# Patient Record
Sex: MALE | Race: BLACK OR AFRICAN AMERICAN | NOT HISPANIC OR LATINO | URBAN - METROPOLITAN AREA
[De-identification: names, ages, dates, MRNs, and addresses within clinical notes are randomized per-mention and may not be internally consistent; named-entity substitution may affect disease eponyms.]

---

## 2019-07-15 ENCOUNTER — OUTPATIENT (OUTPATIENT)
Dept: OUTPATIENT SERVICES | Facility: HOSPITAL | Age: 34
LOS: 1 days | End: 2019-07-15
Payer: COMMERCIAL

## 2019-07-15 DIAGNOSIS — Z22.321 CARRIER OR SUSPECTED CARRIER OF METHICILLIN SUSCEPTIBLE STAPHYLOCOCCUS AUREUS: ICD-10-CM

## 2019-07-15 LAB
MRSA PCR RESULT.: NEGATIVE — SIGNIFICANT CHANGE UP
S AUREUS DNA NOSE QL NAA+PROBE: NEGATIVE — SIGNIFICANT CHANGE UP

## 2019-07-15 PROCEDURE — 87641 MR-STAPH DNA AMP PROBE: CPT

## 2019-07-23 RX ORDER — POVIDONE-IODINE 5 %
1 AEROSOL (ML) TOPICAL ONCE
Refills: 0 | Status: DISCONTINUED | OUTPATIENT
Start: 2019-07-24 | End: 2019-07-24

## 2019-07-23 NOTE — H&P ADULT - NSHPLABSRESULTS_GEN_ALL_CORE
Preop cbc, bmp, pt/inr, ptt, ua wnl;   UA DOS  preop CXR WNL per clearance  preop EKG WNLper clearance

## 2019-07-23 NOTE — H&P ADULT - NSHPPHYSICALEXAM_GEN_ALL_CORE
33 y/o male, well nourished, well developed, NAD  MSK: Decreased ROM at the lumbar spine secondary to pain  sensation intact  DP 2+ brisk cap refill, EHL/FHL/TA/GS 5/5  Rest of PE per MD clearance 33 y/o male, well nourished, well developed, NAD  MSK: Decreased ROM at the lumbar spine secondary to pain  sensation intact  DP/TP 2+ brisk cap refill, EHL/FHL/TA/GS 5/5  Rest of PE per MD clearance
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
## 2019-07-24 ENCOUNTER — OUTPATIENT (OUTPATIENT)
Dept: OUTPATIENT SERVICES | Facility: HOSPITAL | Age: 34
LOS: 1 days | Discharge: HOME | End: 2019-07-24
Payer: COMMERCIAL

## 2019-07-24 VITALS
RESPIRATION RATE: 13 BRPM | DIASTOLIC BLOOD PRESSURE: 78 MMHG | SYSTOLIC BLOOD PRESSURE: 140 MMHG | HEART RATE: 86 BPM | OXYGEN SATURATION: 97 %

## 2019-07-24 VITALS
SYSTOLIC BLOOD PRESSURE: 132 MMHG | TEMPERATURE: 97 F | HEART RATE: 57 BPM | WEIGHT: 299.61 LBS | HEIGHT: 72 IN | RESPIRATION RATE: 16 BRPM | DIASTOLIC BLOOD PRESSURE: 75 MMHG

## 2019-07-24 DIAGNOSIS — L30.9 DERMATITIS, UNSPECIFIED: ICD-10-CM

## 2019-07-24 DIAGNOSIS — J30.9 ALLERGIC RHINITIS, UNSPECIFIED: ICD-10-CM

## 2019-07-24 DIAGNOSIS — Z98.890 OTHER SPECIFIED POSTPROCEDURAL STATES: Chronic | ICD-10-CM

## 2019-07-24 DIAGNOSIS — J45.909 UNSPECIFIED ASTHMA, UNCOMPLICATED: ICD-10-CM

## 2019-07-24 DIAGNOSIS — M51.36 OTHER INTERVERTEBRAL DISC DEGENERATION, LUMBAR REGION: ICD-10-CM

## 2019-07-24 LAB
APPEARANCE UR: CLEAR — SIGNIFICANT CHANGE UP
BILIRUB UR-MCNC: NEGATIVE — SIGNIFICANT CHANGE UP
COLOR SPEC: YELLOW — SIGNIFICANT CHANGE UP
DIFF PNL FLD: NEGATIVE — SIGNIFICANT CHANGE UP
GLUCOSE UR QL: NEGATIVE — SIGNIFICANT CHANGE UP
KETONES UR-MCNC: NEGATIVE — SIGNIFICANT CHANGE UP
LEUKOCYTE ESTERASE UR-ACNC: NEGATIVE — SIGNIFICANT CHANGE UP
NITRITE UR-MCNC: NEGATIVE — SIGNIFICANT CHANGE UP
PH UR: 6 — SIGNIFICANT CHANGE UP (ref 5–8)
PROT UR-MCNC: NEGATIVE MG/DL — SIGNIFICANT CHANGE UP
SP GR SPEC: 1.02 — SIGNIFICANT CHANGE UP (ref 1–1.03)
UROBILINOGEN FLD QL: 0.2 E.U./DL — SIGNIFICANT CHANGE UP

## 2019-07-24 PROCEDURE — 86850 RBC ANTIBODY SCREEN: CPT

## 2019-07-24 PROCEDURE — 86900 BLOOD TYPING SEROLOGIC ABO: CPT

## 2019-07-24 PROCEDURE — 76000 FLUOROSCOPY <1 HR PHYS/QHP: CPT

## 2019-07-24 PROCEDURE — 86901 BLOOD TYPING SEROLOGIC RH(D): CPT

## 2019-07-24 PROCEDURE — 81003 URINALYSIS AUTO W/O SCOPE: CPT

## 2019-07-24 PROCEDURE — 64635 DESTROY LUMB/SAC FACET JNT: CPT | Mod: 50

## 2019-07-24 RX ORDER — ACETAMINOPHEN 500 MG
650 TABLET ORAL EVERY 6 HOURS
Refills: 0 | Status: DISCONTINUED | OUTPATIENT
Start: 2019-07-24 | End: 2019-07-24

## 2019-07-24 RX ORDER — HYDROMORPHONE HYDROCHLORIDE 2 MG/ML
0.5 INJECTION INTRAMUSCULAR; INTRAVENOUS; SUBCUTANEOUS
Refills: 0 | Status: DISCONTINUED | OUTPATIENT
Start: 2019-07-24 | End: 2019-07-24

## 2019-07-24 RX ORDER — ONDANSETRON 8 MG/1
4 TABLET, FILM COATED ORAL EVERY 6 HOURS
Refills: 0 | Status: DISCONTINUED | OUTPATIENT
Start: 2019-07-24 | End: 2019-07-24

## 2019-07-24 RX ORDER — OXYCODONE AND ACETAMINOPHEN 5; 325 MG/1; MG/1
1 TABLET ORAL EVERY 4 HOURS
Refills: 0 | Status: DISCONTINUED | OUTPATIENT
Start: 2019-07-24 | End: 2019-07-24

## 2019-07-24 RX ORDER — OXYCODONE AND ACETAMINOPHEN 5; 325 MG/1; MG/1
2 TABLET ORAL EVERY 4 HOURS
Refills: 0 | Status: DISCONTINUED | OUTPATIENT
Start: 2019-07-24 | End: 2019-07-24

## 2019-07-24 RX ORDER — SODIUM CHLORIDE 9 MG/ML
1000 INJECTION, SOLUTION INTRAVENOUS
Refills: 0 | Status: DISCONTINUED | OUTPATIENT
Start: 2019-07-24 | End: 2019-07-24

## 2019-07-24 RX ADMIN — SODIUM CHLORIDE 100 MILLILITER(S): 9 INJECTION, SOLUTION INTRAVENOUS at 11:33

## 2019-07-24 NOTE — BRIEF OPERATIVE NOTE - NSICDXBRIEFPREOP_GEN_ALL_CORE_FT
PRE-OP DIAGNOSIS:  Degenerative disc disease at L5-S1 level 24-Jul-2019 11:18:39 Degenerative disc disease at L5-S1 level Lexii Dior

## 2019-07-24 NOTE — BRIEF OPERATIVE NOTE - NSICDXBRIEFPOSTOP_GEN_ALL_CORE_FT
POST-OP DIAGNOSIS:  Degenerative disc disease at L5-S1 level 24-Jul-2019 11:18:46 Degenerative disc disease at L5-S1 level Lexii Dior

## 2020-04-27 ENCOUNTER — TRANSCRIPTION ENCOUNTER (OUTPATIENT)
Age: 35
End: 2020-04-27

## 2020-08-17 NOTE — PRE-OP CHECKLIST - AICD PRESENT
Spoke with patient. Reviewed breast biopsy procedure and reviewed instructions for breast biopsy. Patient expressed understanding and all questions were answered. Provided patient with my phone number to call for any further concerns or questions.   Patient scheduled breast biopsy at the [facility] on 8/27/2020.  
no

## 2021-01-12 PROBLEM — J30.9 ALLERGIC RHINITIS, UNSPECIFIED: Chronic | Status: ACTIVE | Noted: 2019-07-23

## 2021-01-12 PROBLEM — L30.9 DERMATITIS, UNSPECIFIED: Chronic | Status: ACTIVE | Noted: 2019-07-23

## 2021-01-12 PROBLEM — J45.909 UNSPECIFIED ASTHMA, UNCOMPLICATED: Chronic | Status: ACTIVE | Noted: 2019-07-23

## 2021-01-21 PROBLEM — Z00.00 ENCOUNTER FOR PREVENTIVE HEALTH EXAMINATION: Status: ACTIVE | Noted: 2021-01-21

## 2021-02-05 ENCOUNTER — APPOINTMENT (OUTPATIENT)
Dept: VASCULAR SURGERY | Facility: CLINIC | Age: 36
End: 2021-02-05
Payer: COMMERCIAL

## 2021-02-05 PROCEDURE — 99072 ADDL SUPL MATRL&STAF TM PHE: CPT

## 2021-02-05 PROCEDURE — 99244 OFF/OP CNSLTJ NEW/EST MOD 40: CPT

## 2021-02-05 NOTE — ADDENDUM
[FreeTextEntry1] : This note was written by Clarissa Garces on 02/05/2021 acting as scribe for Rodolfo Hoyt M.D.\par \par I, Rodolfo Harris have read and attest that all the information, medical decision making and discharge instructions within are true and accurate.
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
## 2021-02-05 NOTE — PHYSICAL EXAM
[Respiratory Effort] : normal respiratory effort [Normal Rate and Rhythm] : normal rate and rhythm [No Rash or Lesion] : No rash or lesion [Alert] : alert [Oriented to Person] : oriented to person [Oriented to Place] : oriented to place [Oriented to Time] : oriented to time [Calm] : calm [Ankle Swelling (On Exam)] : not present [Varicose Veins Of Lower Extremities] : not present [] : not present [Abdomen Masses] : No abdominal masses [Abdomen Tenderness] : ~T ~M No abdominal tenderness [Abdominal Bruit] : no abdominal bruit  [de-identified] : Well appearing  [de-identified] : NC/AT  [de-identified] : Supple  [de-identified] : FROM

## 2021-02-05 NOTE — ASSESSMENT
[FreeTextEntry1] : 35 y/o M with chronic back pain presents for surgical discussion of upcoming L4-L5 ALIF on 2/17 with Dr. Gagnon. \par Vascular surgery approach discussed at great length with patient. The risks benefits and alternatives were explained to him including but not limiting to arterial, venous injury, wound infection, retrograde ejaculation, nerve damage, DVT, PE. Numerous questions were asked and answered to his satisfaction. From a vascular surgery stand point pt is cleared. To call the office in case of having any further questions. \par \par \par

## 2021-02-19 DIAGNOSIS — Z01.818 ENCOUNTER FOR OTHER PREPROCEDURAL EXAMINATION: ICD-10-CM

## 2021-02-20 ENCOUNTER — APPOINTMENT (OUTPATIENT)
Dept: DISASTER EMERGENCY | Facility: CLINIC | Age: 36
End: 2021-02-20

## 2021-02-21 LAB — SARS-COV-2 N GENE NPH QL NAA+PROBE: NOT DETECTED

## 2021-02-22 ENCOUNTER — TRANSCRIPTION ENCOUNTER (OUTPATIENT)
Age: 36
End: 2021-02-22

## 2021-02-22 VITALS
DIASTOLIC BLOOD PRESSURE: 87 MMHG | RESPIRATION RATE: 16 BRPM | SYSTOLIC BLOOD PRESSURE: 129 MMHG | HEIGHT: 73 IN | HEART RATE: 69 BPM | WEIGHT: 287.92 LBS | OXYGEN SATURATION: 98 % | TEMPERATURE: 97 F

## 2021-02-22 NOTE — H&P ADULT - NSHPLABSRESULTS_GEN_ALL_CORE
Preop cbc, bmp, pt/inr, ptt, wnl reviewed by medical clearance.  COVID negative 2/20/2021  ua DOS  3M DOS   preop ekg wnl reviewed by medical clearance   ECHO wnl reviewed by medical clearance  cardiac stress test wnl reviewed by medical clearance

## 2021-02-22 NOTE — H&P ADULT - NSHPPHYSICALEXAM_GEN_ALL_CORE
General: Alert and oriented, NAD  MSK:  Decreased ROM of lumbar spine secondary to pain.      Remainder of PE as per medical clearance General: Alert and oriented, NAD  MSK: Lower extremities skin intact, no erythema/sts/ecchymosis. SLT INTACT, DP/PT 2+, EHL/TA/GS 5/5.   Decreased ROM of lumbar spine secondary to pain.  Remainder of PE as per medical clearance

## 2021-02-22 NOTE — H&P ADULT - PROBLEM SELECTOR PLAN 1
Admit to orthopedics.  Presents for elective ALIF & PSF L4-S1  Medically optimized and cleared for surgery by Dr. Simmons
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
## 2021-02-23 ENCOUNTER — RESULT REVIEW (OUTPATIENT)
Age: 36
End: 2021-02-23

## 2021-02-23 ENCOUNTER — INPATIENT (INPATIENT)
Facility: HOSPITAL | Age: 36
LOS: 0 days | Discharge: HOME | DRG: 455 | End: 2021-02-24
Attending: ORTHOPAEDIC SURGERY | Admitting: ORTHOPAEDIC SURGERY
Payer: COMMERCIAL

## 2021-02-23 DIAGNOSIS — J45.909 UNSPECIFIED ASTHMA, UNCOMPLICATED: ICD-10-CM

## 2021-02-23 DIAGNOSIS — J30.9 ALLERGIC RHINITIS, UNSPECIFIED: ICD-10-CM

## 2021-02-23 DIAGNOSIS — M54.9 DORSALGIA, UNSPECIFIED: ICD-10-CM

## 2021-02-23 DIAGNOSIS — Z98.890 OTHER SPECIFIED POSTPROCEDURAL STATES: Chronic | ICD-10-CM

## 2021-02-23 LAB
BASE EXCESS BLDA CALC-SCNC: -3.9 MMOL/L — LOW (ref -2–3)
BLD GP AB SCN SERPL QL: NEGATIVE — SIGNIFICANT CHANGE UP
CA-I BLDA-SCNC: 1.04 MMOL/L — LOW (ref 1.12–1.3)
COHGB MFR BLDA: 0.2 % — SIGNIFICANT CHANGE UP
GAS PNL BLDA: SIGNIFICANT CHANGE UP
GAS PNL BLDA: SIGNIFICANT CHANGE UP
GLUCOSE BLDC GLUCOMTR-MCNC: 128 MG/DL — HIGH (ref 70–99)
HCO3 BLDA-SCNC: 19 MMOL/L — LOW (ref 21–28)
HGB BLDA-MCNC: 13.1 G/DL — SIGNIFICANT CHANGE UP (ref 13–17)
METHGB MFR BLDA: 0.3 % — SIGNIFICANT CHANGE UP
O2 CT VFR BLDA CALC: 19.7 ML/DL — SIGNIFICANT CHANGE UP (ref 15–23)
OXYHGB MFR BLDA: 99 % — SIGNIFICANT CHANGE UP (ref 94–100)
PCO2 BLDA: 27 MMHG — LOW (ref 35–48)
PH BLDA: 7.45 — SIGNIFICANT CHANGE UP (ref 7.35–7.45)
PO2 BLDA: 506 MMHG — HIGH (ref 83–108)
POTASSIUM BLDA-SCNC: 3.4 MMOL/L — LOW (ref 3.5–4.9)
RH IG SCN BLD-IMP: POSITIVE — SIGNIFICANT CHANGE UP
SAO2 % BLDA: 100 % — SIGNIFICANT CHANGE UP (ref 95–100)
SODIUM BLDA-SCNC: 139 MMOL/L — SIGNIFICANT CHANGE UP (ref 138–146)

## 2021-02-23 PROCEDURE — 22585 ARTHRD ANT NTRBD MIN DSC EA: CPT | Mod: GC,62

## 2021-02-23 PROCEDURE — 88304 TISSUE EXAM BY PATHOLOGIST: CPT | Mod: 26

## 2021-02-23 PROCEDURE — 22558 ARTHRD ANT NTRBD MIN DSC LUM: CPT | Mod: GC,62

## 2021-02-23 RX ORDER — CHLORHEXIDINE GLUCONATE 213 G/1000ML
1 SOLUTION TOPICAL ONCE
Refills: 0 | Status: COMPLETED | OUTPATIENT
Start: 2021-02-23 | End: 2021-02-23

## 2021-02-23 RX ORDER — SENNA PLUS 8.6 MG/1
2 TABLET ORAL AT BEDTIME
Refills: 0 | Status: DISCONTINUED | OUTPATIENT
Start: 2021-02-23 | End: 2021-02-24

## 2021-02-23 RX ORDER — ROBINUL 0.2 MG/ML
0.2 INJECTION INTRAMUSCULAR; INTRAVENOUS ONCE
Refills: 0 | Status: DISCONTINUED | OUTPATIENT
Start: 2021-02-23 | End: 2021-02-24

## 2021-02-23 RX ORDER — METHOCARBAMOL 500 MG/1
500 TABLET, FILM COATED ORAL EVERY 8 HOURS
Refills: 0 | Status: DISCONTINUED | OUTPATIENT
Start: 2021-02-23 | End: 2021-02-24

## 2021-02-23 RX ORDER — MAGNESIUM HYDROXIDE 400 MG/1
30 TABLET, CHEWABLE ORAL EVERY 12 HOURS
Refills: 0 | Status: DISCONTINUED | OUTPATIENT
Start: 2021-02-23 | End: 2021-02-24

## 2021-02-23 RX ORDER — OXYCODONE HYDROCHLORIDE 5 MG/1
5 TABLET ORAL EVERY 4 HOURS
Refills: 0 | Status: DISCONTINUED | OUTPATIENT
Start: 2021-02-23 | End: 2021-02-23

## 2021-02-23 RX ORDER — GABAPENTIN 400 MG/1
300 CAPSULE ORAL ONCE
Refills: 0 | Status: COMPLETED | OUTPATIENT
Start: 2021-02-23 | End: 2021-02-23

## 2021-02-23 RX ORDER — ONDANSETRON 8 MG/1
4 TABLET, FILM COATED ORAL EVERY 6 HOURS
Refills: 0 | Status: DISCONTINUED | OUTPATIENT
Start: 2021-02-23 | End: 2021-02-24

## 2021-02-23 RX ORDER — ACETAMINOPHEN 500 MG
1000 TABLET ORAL ONCE
Refills: 0 | Status: COMPLETED | OUTPATIENT
Start: 2021-02-23 | End: 2021-02-23

## 2021-02-23 RX ORDER — HYDROMORPHONE HYDROCHLORIDE 2 MG/ML
30 INJECTION INTRAMUSCULAR; INTRAVENOUS; SUBCUTANEOUS
Refills: 0 | Status: DISCONTINUED | OUTPATIENT
Start: 2021-02-23 | End: 2021-02-24

## 2021-02-23 RX ORDER — CEFAZOLIN SODIUM 1 G
2000 VIAL (EA) INJECTION EVERY 8 HOURS
Refills: 0 | Status: COMPLETED | OUTPATIENT
Start: 2021-02-23 | End: 2021-02-24

## 2021-02-23 RX ORDER — SODIUM CHLORIDE 9 MG/ML
1000 INJECTION, SOLUTION INTRAVENOUS
Refills: 0 | Status: DISCONTINUED | OUTPATIENT
Start: 2021-02-23 | End: 2021-02-24

## 2021-02-23 RX ORDER — UMECLIDINIUM 62.5 UG/1
1 AEROSOL, POWDER ORAL
Qty: 0 | Refills: 0 | DISCHARGE

## 2021-02-23 RX ORDER — FLUTICASONE FUROATE AND VILANTEROL TRIFENATATE 100; 25 UG/1; UG/1
1 POWDER RESPIRATORY (INHALATION)
Qty: 0 | Refills: 0 | DISCHARGE

## 2021-02-23 RX ORDER — ONDANSETRON 8 MG/1
4 TABLET, FILM COATED ORAL EVERY 6 HOURS
Refills: 0 | Status: DISCONTINUED | OUTPATIENT
Start: 2021-02-23 | End: 2021-02-23

## 2021-02-23 RX ORDER — CETIRIZINE HYDROCHLORIDE 10 MG/1
1 TABLET ORAL
Qty: 0 | Refills: 0 | DISCHARGE

## 2021-02-23 RX ORDER — ACETAMINOPHEN 500 MG
1000 TABLET ORAL EVERY 8 HOURS
Refills: 0 | Status: DISCONTINUED | OUTPATIENT
Start: 2021-02-23 | End: 2021-02-24

## 2021-02-23 RX ORDER — HYDROMORPHONE HYDROCHLORIDE 2 MG/ML
0.5 INJECTION INTRAMUSCULAR; INTRAVENOUS; SUBCUTANEOUS
Refills: 0 | Status: DISCONTINUED | OUTPATIENT
Start: 2021-02-23 | End: 2021-02-24

## 2021-02-23 RX ORDER — BUPIVACAINE 13.3 MG/ML
20 INJECTION, SUSPENSION, LIPOSOMAL INFILTRATION ONCE
Refills: 0 | Status: DISCONTINUED | OUTPATIENT
Start: 2021-02-23 | End: 2021-02-24

## 2021-02-23 RX ORDER — APREPITANT 80 MG/1
40 CAPSULE ORAL ONCE
Refills: 0 | Status: COMPLETED | OUTPATIENT
Start: 2021-02-23 | End: 2021-02-23

## 2021-02-23 RX ORDER — PROCHLORPERAZINE MALEATE 5 MG
10 TABLET ORAL EVERY 8 HOURS
Refills: 0 | Status: DISCONTINUED | OUTPATIENT
Start: 2021-02-23 | End: 2021-02-24

## 2021-02-23 RX ORDER — NALOXONE HYDROCHLORIDE 4 MG/.1ML
0.1 SPRAY NASAL
Refills: 0 | Status: DISCONTINUED | OUTPATIENT
Start: 2021-02-23 | End: 2021-02-24

## 2021-02-23 RX ADMIN — SENNA PLUS 2 TABLET(S): 8.6 TABLET ORAL at 23:19

## 2021-02-23 RX ADMIN — METHOCARBAMOL 500 MILLIGRAM(S): 500 TABLET, FILM COATED ORAL at 23:19

## 2021-02-23 RX ADMIN — Medication 1000 MILLIGRAM(S): at 07:01

## 2021-02-23 RX ADMIN — Medication 1000 MILLIGRAM(S): at 23:18

## 2021-02-23 RX ADMIN — GABAPENTIN 300 MILLIGRAM(S): 400 CAPSULE ORAL at 07:01

## 2021-02-23 RX ADMIN — APREPITANT 40 MILLIGRAM(S): 80 CAPSULE ORAL at 07:01

## 2021-02-23 RX ADMIN — HYDROMORPHONE HYDROCHLORIDE 30 MILLILITER(S): 2 INJECTION INTRAMUSCULAR; INTRAVENOUS; SUBCUTANEOUS at 20:01

## 2021-02-23 RX ADMIN — Medication 100 MILLIGRAM(S): at 21:23

## 2021-02-23 RX ADMIN — CHLORHEXIDINE GLUCONATE 1 APPLICATION(S): 213 SOLUTION TOPICAL at 07:28

## 2021-02-23 NOTE — PACU DISCHARGE NOTE - COMMENTS
pt met pacu criteria to be tx to telemetry. Vitals stable, pain controlled, no bleeding noted.  Report given to CharleeWnara Coto

## 2021-02-23 NOTE — BRIEF OPERATIVE NOTE - NSICDXBRIEFPROCEDURE_GEN_ALL_CORE_FT
PROCEDURES:  Fusion, posterior spinal column, lumbar, percutaneous 23-Feb-2021 15:20:30  Carlos Naqvi  Open ALIF 23-Feb-2021 15:20:16  Carlos Naqvi

## 2021-02-24 ENCOUNTER — TRANSCRIPTION ENCOUNTER (OUTPATIENT)
Age: 36
End: 2021-02-24

## 2021-02-24 VITALS
HEART RATE: 75 BPM | TEMPERATURE: 98 F | SYSTOLIC BLOOD PRESSURE: 124 MMHG | RESPIRATION RATE: 17 BRPM | OXYGEN SATURATION: 96 % | DIASTOLIC BLOOD PRESSURE: 67 MMHG

## 2021-02-24 LAB
ANION GAP SERPL CALC-SCNC: 13 MMOL/L — SIGNIFICANT CHANGE UP (ref 5–17)
BASOPHILS # BLD AUTO: 0.01 K/UL — SIGNIFICANT CHANGE UP (ref 0–0.2)
BASOPHILS NFR BLD AUTO: 0.1 % — SIGNIFICANT CHANGE UP (ref 0–2)
BUN SERPL-MCNC: 11 MG/DL — SIGNIFICANT CHANGE UP (ref 7–23)
CALCIUM SERPL-MCNC: 8.8 MG/DL — SIGNIFICANT CHANGE UP (ref 8.4–10.5)
CHLORIDE SERPL-SCNC: 106 MMOL/L — SIGNIFICANT CHANGE UP (ref 96–108)
CO2 SERPL-SCNC: 23 MMOL/L — SIGNIFICANT CHANGE UP (ref 22–31)
CREAT SERPL-MCNC: 1.05 MG/DL — SIGNIFICANT CHANGE UP (ref 0.5–1.3)
EOSINOPHIL # BLD AUTO: 0 K/UL — SIGNIFICANT CHANGE UP (ref 0–0.5)
EOSINOPHIL NFR BLD AUTO: 0 % — SIGNIFICANT CHANGE UP (ref 0–6)
GLUCOSE SERPL-MCNC: 102 MG/DL — HIGH (ref 70–99)
HCT VFR BLD CALC: 41.4 % — SIGNIFICANT CHANGE UP (ref 39–50)
HGB BLD-MCNC: 13.4 G/DL — SIGNIFICANT CHANGE UP (ref 13–17)
IMM GRANULOCYTES NFR BLD AUTO: 0.5 % — SIGNIFICANT CHANGE UP (ref 0–1.5)
LYMPHOCYTES # BLD AUTO: 1.17 K/UL — SIGNIFICANT CHANGE UP (ref 1–3.3)
LYMPHOCYTES # BLD AUTO: 10.6 % — LOW (ref 13–44)
MCHC RBC-ENTMCNC: 28.2 PG — SIGNIFICANT CHANGE UP (ref 27–34)
MCHC RBC-ENTMCNC: 32.4 GM/DL — SIGNIFICANT CHANGE UP (ref 32–36)
MCV RBC AUTO: 87 FL — SIGNIFICANT CHANGE UP (ref 80–100)
MONOCYTES # BLD AUTO: 0.77 K/UL — SIGNIFICANT CHANGE UP (ref 0–0.9)
MONOCYTES NFR BLD AUTO: 7 % — SIGNIFICANT CHANGE UP (ref 2–14)
NEUTROPHILS # BLD AUTO: 9.04 K/UL — HIGH (ref 1.8–7.4)
NEUTROPHILS NFR BLD AUTO: 81.8 % — HIGH (ref 43–77)
NRBC # BLD: 0 /100 WBCS — SIGNIFICANT CHANGE UP (ref 0–0)
PLATELET # BLD AUTO: 297 K/UL — SIGNIFICANT CHANGE UP (ref 150–400)
POTASSIUM SERPL-MCNC: 3.9 MMOL/L — SIGNIFICANT CHANGE UP (ref 3.5–5.3)
POTASSIUM SERPL-SCNC: 3.9 MMOL/L — SIGNIFICANT CHANGE UP (ref 3.5–5.3)
RBC # BLD: 4.76 M/UL — SIGNIFICANT CHANGE UP (ref 4.2–5.8)
RBC # FLD: 12.9 % — SIGNIFICANT CHANGE UP (ref 10.3–14.5)
SODIUM SERPL-SCNC: 142 MMOL/L — SIGNIFICANT CHANGE UP (ref 135–145)
WBC # BLD: 11.04 K/UL — HIGH (ref 3.8–10.5)
WBC # FLD AUTO: 11.04 K/UL — HIGH (ref 3.8–10.5)

## 2021-02-24 PROCEDURE — 82962 GLUCOSE BLOOD TEST: CPT

## 2021-02-24 PROCEDURE — 82330 ASSAY OF CALCIUM: CPT

## 2021-02-24 PROCEDURE — 86901 BLOOD TYPING SEROLOGIC RH(D): CPT

## 2021-02-24 PROCEDURE — 86900 BLOOD TYPING SEROLOGIC ABO: CPT

## 2021-02-24 PROCEDURE — C1769: CPT

## 2021-02-24 PROCEDURE — 99254 IP/OBS CNSLTJ NEW/EST MOD 60: CPT

## 2021-02-24 PROCEDURE — 80048 BASIC METABOLIC PNL TOTAL CA: CPT

## 2021-02-24 PROCEDURE — 84295 ASSAY OF SERUM SODIUM: CPT

## 2021-02-24 PROCEDURE — 88304 TISSUE EXAM BY PATHOLOGIST: CPT

## 2021-02-24 PROCEDURE — 86850 RBC ANTIBODY SCREEN: CPT

## 2021-02-24 PROCEDURE — 94640 AIRWAY INHALATION TREATMENT: CPT

## 2021-02-24 PROCEDURE — 76000 FLUOROSCOPY <1 HR PHYS/QHP: CPT

## 2021-02-24 PROCEDURE — 36415 COLL VENOUS BLD VENIPUNCTURE: CPT

## 2021-02-24 PROCEDURE — 85018 HEMOGLOBIN: CPT

## 2021-02-24 PROCEDURE — C1713: CPT

## 2021-02-24 PROCEDURE — 85025 COMPLETE CBC W/AUTO DIFF WBC: CPT

## 2021-02-24 PROCEDURE — 97161 PT EVAL LOW COMPLEX 20 MIN: CPT

## 2021-02-24 PROCEDURE — C1889: CPT

## 2021-02-24 PROCEDURE — 84132 ASSAY OF SERUM POTASSIUM: CPT

## 2021-02-24 PROCEDURE — 82803 BLOOD GASES ANY COMBINATION: CPT

## 2021-02-24 RX ORDER — SENNA PLUS 8.6 MG/1
2 TABLET ORAL
Qty: 0 | Refills: 0 | DISCHARGE
Start: 2021-02-24

## 2021-02-24 RX ORDER — OXYCODONE HYDROCHLORIDE 5 MG/1
5 TABLET ORAL EVERY 4 HOURS
Refills: 0 | Status: DISCONTINUED | OUTPATIENT
Start: 2021-02-24 | End: 2021-02-24

## 2021-02-24 RX ORDER — OXYCODONE HYDROCHLORIDE 5 MG/1
10 TABLET ORAL EVERY 4 HOURS
Refills: 0 | Status: DISCONTINUED | OUTPATIENT
Start: 2021-02-24 | End: 2021-02-24

## 2021-02-24 RX ORDER — LORATADINE 10 MG/1
10 TABLET ORAL DAILY
Refills: 0 | Status: DISCONTINUED | OUTPATIENT
Start: 2021-02-24 | End: 2021-02-24

## 2021-02-24 RX ORDER — TIOTROPIUM BROMIDE 18 UG/1
1 CAPSULE ORAL; RESPIRATORY (INHALATION) DAILY
Refills: 0 | Status: DISCONTINUED | OUTPATIENT
Start: 2021-02-24 | End: 2021-02-24

## 2021-02-24 RX ORDER — BUDESONIDE AND FORMOTEROL FUMARATE DIHYDRATE 160; 4.5 UG/1; UG/1
2 AEROSOL RESPIRATORY (INHALATION)
Refills: 0 | Status: DISCONTINUED | OUTPATIENT
Start: 2021-02-24 | End: 2021-02-24

## 2021-02-24 RX ORDER — METHOCARBAMOL 500 MG/1
1 TABLET, FILM COATED ORAL
Qty: 42 | Refills: 0
Start: 2021-02-24 | End: 2021-03-09

## 2021-02-24 RX ORDER — POLYETHYLENE GLYCOL 3350 17 G/17G
17 POWDER, FOR SOLUTION ORAL DAILY
Refills: 0 | Status: DISCONTINUED | OUTPATIENT
Start: 2021-02-24 | End: 2021-02-24

## 2021-02-24 RX ORDER — OXYCODONE HYDROCHLORIDE 5 MG/1
1 TABLET ORAL
Qty: 42 | Refills: 0
Start: 2021-02-24 | End: 2021-03-02

## 2021-02-24 RX ADMIN — Medication 100 MILLIGRAM(S): at 06:57

## 2021-02-24 RX ADMIN — METHOCARBAMOL 500 MILLIGRAM(S): 500 TABLET, FILM COATED ORAL at 13:58

## 2021-02-24 RX ADMIN — OXYCODONE HYDROCHLORIDE 10 MILLIGRAM(S): 5 TABLET ORAL at 18:28

## 2021-02-24 RX ADMIN — TIOTROPIUM BROMIDE 1 CAPSULE(S): 18 CAPSULE ORAL; RESPIRATORY (INHALATION) at 11:25

## 2021-02-24 RX ADMIN — LORATADINE 10 MILLIGRAM(S): 10 TABLET ORAL at 11:24

## 2021-02-24 RX ADMIN — Medication 1000 MILLIGRAM(S): at 06:57

## 2021-02-24 RX ADMIN — POLYETHYLENE GLYCOL 3350 17 GRAM(S): 17 POWDER, FOR SOLUTION ORAL at 11:24

## 2021-02-24 RX ADMIN — BUDESONIDE AND FORMOTEROL FUMARATE DIHYDRATE 2 PUFF(S): 160; 4.5 AEROSOL RESPIRATORY (INHALATION) at 18:29

## 2021-02-24 RX ADMIN — Medication 1000 MILLIGRAM(S): at 13:56

## 2021-02-24 RX ADMIN — METHOCARBAMOL 500 MILLIGRAM(S): 500 TABLET, FILM COATED ORAL at 06:57

## 2021-02-24 NOTE — DISCHARGE NOTE PROVIDER - CARE PROVIDER_API CALL
Adriel Dominguez)  Orthopaedic Surgery  [address] [place] [id number]  Phone: [phone]  Fax: [phone]  Follow Up Time: 2 weeks

## 2021-02-24 NOTE — DISCHARGE NOTE PROVIDER - NSDCFUADDINST_GEN_ALL_CORE_FT
No strenuous activity (bending/twisting), heavy lifting, driving, exercising/gym activities or returning to work until cleared by MD.   Change dressing daily with gauze/tape till post-op day #5, then leave incision open to air.  You may shower post-op day#5, keep incision clean and dry.   Try to have regular bowel movements, take stool softener or laxative if necessary.  May take pepcid for upset stomach.  May apply ice to affected areas to decrease swelling.  Call to schedule an appt with Dr. Dominguez for follow up, if you have staples or sutures they will be removed in office.  Contact your doctor if you experience: fever greater than 101.5, chills, chest pain, difficulty breathing, redness or excessive drainage around the incision, other concerns.  Follow up with your primary care provider.

## 2021-02-24 NOTE — DISCHARGE NOTE PROVIDER - NSDCMRMEDTOKEN_GEN_ALL_CORE_FT
Breo Ellipta 200 mcg-25 mcg/inh inhalation powder: 1 puff(s) inhaled once a day  Incruse Ellipta 62.5 mcg/inh inhalation powder: 1 puff(s) inhaled every 24 hours  ZyrTEC 10 mg oral tablet: 1 tab(s) orally once a day   Breo Ellipta 200 mcg-25 mcg/inh inhalation powder: 1 puff(s) inhaled once a day  Incruse Ellipta 62.5 mcg/inh inhalation powder: 1 puff(s) inhaled every 24 hours  methocarbamol 500 mg oral tablet: 1 tab(s) orally every 8 hours MDD:3  oxyCODONE 5 mg oral tablet: 1-2  tab(s) orally every 4 hours, As needed, Moderate to severePain (4 - 6) MDD:6  senna oral tablet: 2 tab(s) orally once a day (at bedtime)  ZyrTEC 10 mg oral tablet: 1 tab(s) orally once a day

## 2021-02-24 NOTE — PROGRESS NOTE ADULT - SUBJECTIVE AND OBJECTIVE BOX
Ortho Notwe    Procedure: ALIF/PSF L4-S1  Surgeon: Alberto    pt seen at bedside this AM. on PCA. states pain is well controlled at rest however with movement pain increases significantly.   has had some leaks from his his catheter ON, will DC this AM.   has passed gas overnight no BMs.   tolerating PO clear diet without issues.   Denies CP, SOB, N/V, numbness/tingling     Vital Signs Last 24 Hrs  T(C): --  T(F): --  HR: 46 (02-23-21 @ 20:03) (46 - 66)  BP: 127/82 (02-23-21 @ 20:03) (120/72 - 136/79)  BP(mean): 99 (02-23-21 @ 20:03) (82 - 102)  RR: 17 (02-23-21 @ 20:03) (16 - 19)  SpO2: 100% (02-23-21 @ 20:03) (99% - 100%)      General: Pt Alert and oriented, NAD  Abd prineo intact, abd soft, nontender nondistended  back prineo intact.   Sensation intact BLL1-S1 b/l  Motor strength intact BL LE EHL/FHL/TA/GS 5/5  palpable DP pulses b/l           A/P: 36yMale POD#1 s/p above procedure 2/23   - Stable  - Pain Control  - DVT ppx: SCDs  - monitor abdominal exam  - DC bearden  - PT, WBS: WBAT  - Dispo home     Ortho Pager [phone]
Ortho Note    Pt comfortable without complaints, pain controlled  Denies CP, SOB, N/V, numbness/tingling     Vital Signs Last 24 Hrs  T(C): 36.7 (02-24-21 @ 08:22), Max: 36.7 (02-24-21 @ 08:22)  T(F): 98 (02-24-21 @ 08:22), Max: 98 (02-24-21 @ 08:22)  HR: 67 (02-24-21 @ 08:22) (67 - 76)  BP: 110/60 (02-24-21 @ 08:22) (110/60 - 115/56)  BP(mean): --  RR: 12 (02-24-21 @ 08:22) (12 - 20)  SpO2: 97% (02-24-21 @ 08:22) (97% - 99%)  AVSS    General: Pt Alert and oriented, NAD  DSG: abdominal prineo C/D/I, abdominal binder  Pulses: bilateral pedal 2+< wwp toes, cap refill <3sec toes  Sensation: bilateral SILT  Motor: bilateral 5/5 EHL/FHL/TA/GS    02-24    142  |  106  |  11  ----------------------------<  102<H>  3.9   |  23  |  1.05    Ca    8.8      24 Feb 2021 08:21                            13.4   11.04 )-----------( 297      ( 24 Feb 2021 08:21 )             41.4       A/P: 36y Male POD#1 s/p L4-S1 ALIF/PSF  - Stable, afebrile, nontoxic appearance  - Pain Control: po meds  - DVT ppx: SCDS  - PT, WBS: WBAT spinal precautions  -Bowel regimen: continue bowel regimen  -Dispo: home pending PT clearance    Ortho Pager [phone]
24 hr events: NAEO    Subjective: Pt seen and examined at bedside this AM. He reports he has some back pain, but otherwise is feeling well. Endorses passing gas, no BMs. Tolerating CLD. Has not worked with PT yet.      Vital Signs Last 24 Hrs  T(C): 36.7 (24 Feb 2021 08:22), Max: 36.7 (23 Feb 2021 22:05)  T(F): 98 (24 Feb 2021 08:22), Max: 98 (23 Feb 2021 22:05)  HR: 67 (24 Feb 2021 08:22) (46 - 76)  BP: 110/60 (24 Feb 2021 08:22) (110/60 - 137/99)  BP(mean): 114 (23 Feb 2021 21:40) (82 - 114)  RR: 12 (24 Feb 2021 08:22) (12 - 23)  SpO2: 97% (24 Feb 2021 08:22) (97% - 100%)    I&O's Summary    23 Feb 2021 07:01  -  24 Feb 2021 07:00  --------------------------------------------------------  IN: 5100 mL / OUT: 4100 mL / NET: 1000 mL    24 Feb 2021 07:01  -  24 Feb 2021 11:17  --------------------------------------------------------  IN: 500 mL / OUT: 150 mL / NET: 350 mL        Physical Exam:  General: NAD, resting comfortably  Pulmonary: normal resp effort  Cardiovascular: NSR  Abdominal: soft, minimally tender to palpation near incision, c/d/i  Extremities: WWP, normal strength, no clubbing/cyanosis/edema. Palpable pedal pulses  Neuro: A/O x 3, no focal deficits, sensation normal      Lines/drains/tubes:    LABS:                        13.4   11.04 )-----------( 297      ( 24 Feb 2021 08:21 )             41.4     02-24    142  |  106  |  11  ----------------------------<  102<H>  3.9   |  23  |  1.05    Ca    8.8      24 Feb 2021 08:21            CAPILLARY BLOOD GLUCOSE      POCT Blood Glucose.: 128 mg/dL (23 Feb 2021 14:30)      RADIOLOGY & ADDITIONAL TESTS:  
Ortho Post Op Check    Procedure: ALIF/PSF L4-S1  Surgeon: Alberto    Pt comfortable without complaints, pain controlled  Denies CP, SOB, N/V, numbness/tingling     Vital Signs Last 24 Hrs  T(C): --  T(F): --  HR: 46 (02-23-21 @ 20:03) (46 - 66)  BP: 127/82 (02-23-21 @ 20:03) (120/72 - 136/79)  BP(mean): 99 (02-23-21 @ 20:03) (82 - 102)  RR: 17 (02-23-21 @ 20:03) (16 - 19)  SpO2: 100% (02-23-21 @ 20:03) (99% - 100%)  AVSS    General: Pt Alert and oriented, NAD  Abd and back DSG C/D/I  Sensation intact BL LE  Motor strength intact BL LE      Post-op X-Ray: Intraop fluoro shows hardware intact     A/P: 36yMale POD#0 s/p above procedure 2/23   - Stable  - Pain Control  - DVT ppx: SCDs  - Post op abx: ancef periop  - PT, WBS: WBAT  - Dispo home     Ortho Pager [phone]
  Patient is a 36y old  Male who presents with a chief complaint of LBP (24 Feb 2021 11:49)      INTERVAL HPI/OVERNIGHT EVENTS:    Review of Systems: 12 point review of systems otherwise negative    MEDICATIONS  (STANDING):  acetaminophen   Tablet .. 1000 milliGRAM(s) Oral every 8 hours  budesonide 160 MICROgram(s)/formoterol 4.5 MICROgram(s) Inhaler 2 Puff(s) Inhalation two times a day  BUpivacaine liposome 1.3% Injectable (no eMAR) 20 milliLiter(s) Local Injection once  loratadine 10 milliGRAM(s) Oral daily  methocarbamol 500 milliGRAM(s) Oral every 8 hours  polyethylene glycol 3350 17 Gram(s) Oral daily  senna 2 Tablet(s) Oral at bedtime  tiotropium 18 MICROgram(s) Capsule 1 Capsule(s) Inhalation daily    MEDICATIONS  (PRN):  aluminum hydroxide/magnesium hydroxide/simethicone Suspension 30 milliLiter(s) Oral every 12 hours PRN Indigestion  glycopyrrolate Injectable 0.2 milliGRAM(s) IV Push once PRN HR <60  HYDROmorphone  Injectable 0.5 milliGRAM(s) IV Push every 15 minutes PRN Severe Pain (7 - 10)  magnesium hydroxide Suspension 30 milliLiter(s) Oral every 12 hours PRN Constipation  oxyCODONE    IR 5 milliGRAM(s) Oral every 4 hours PRN Moderate Pain (4 - 6)  oxyCODONE    IR 10 milliGRAM(s) Oral every 4 hours PRN Severe Pain (7 - 10)  prochlorperazine   Injectable 10 milliGRAM(s) IV Push every 8 hours PRN 1st line zofran ineffective      Allergies    penicillins (Anaphylaxis; Rash)  Seafood (Anaphylaxis)  shellfish (Anaphylaxis)    Intolerances          Vital Signs Last 24 Hrs  T(C): 36.7 (24 Feb 2021 08:22), Max: 36.7 (23 Feb 2021 22:05)  T(F): 98 (24 Feb 2021 08:22), Max: 98 (23 Feb 2021 22:05)  HR: 67 (24 Feb 2021 08:22) (46 - 76)  BP: 110/60 (24 Feb 2021 08:22) (110/60 - 137/99)  BP(mean): 114 (23 Feb 2021 21:40) (82 - 114)  RR: 12 (24 Feb 2021 08:22) (12 - 23)  SpO2: 97% (24 Feb 2021 08:22) (97% - 100%)  CAPILLARY BLOOD GLUCOSE      POCT Blood Glucose.: 128 mg/dL (23 Feb 2021 14:30)      02-23 @ 07:01 - 02-24 @ 07:00  --------------------------------------------------------  IN: 5100 mL / OUT: 4100 mL / NET: 1000 mL    02-24 @ 07:01  - 02-24 @ 13:46  --------------------------------------------------------  IN: 500 mL / OUT: 150 mL / NET: 350 mL        Physical Exam:    Daily     Daily   General:  Well appearing, NAD, not cachetic  HEENT:  Nonicteric, PERRLA  CV:  RRR, no murmur, no JVD  Lungs:  CTA B/L, no wheezes, rales, rhonchi  Abdomen:  Soft, non-tender, no distended, positive BS, no hepatosplenomegaly; +abd binder   Extremities:  2+ pulses, no c/c, no edema; dsg c/d/i  Skin:  Warm and dry, no rashes  :  No bearden  Neuro:  AAOx3, non-focal, CN II-XII grossly intact  No Restraints    LABS:                        13.4   11.04 )-----------( 297      ( 24 Feb 2021 08:21 )             41.4     02-24    142  |  106  |  11  ----------------------------<  102<H>  3.9   |  23  |  1.05    Ca    8.8      24 Feb 2021 08:21

## 2021-02-24 NOTE — PROGRESS NOTE ADULT - ASSESSMENT
36 y.o m pmh asthma, allergies, lumbar back pain now POD#1 anterior-posterior fusion L4-S1.    #pod1  pain control  bowel regimen  pt  IS    #leukocytosis  reactive likely 2/2 surgery  monitor fever curve    #asthma  #allergies  please resume home medications    dvt ppx per primary team  diet regular

## 2021-02-24 NOTE — DISCHARGE NOTE PROVIDER - NSDCCPTREATMENT_GEN_ALL_CORE_FT
PRINCIPAL PROCEDURE  Procedure: Open ALIF  Findings and Treatment: L4-S1      SECONDARY PROCEDURE  Procedure: Fusion, posterior spinal column, lumbar, percutaneous  Findings and Treatment: L4-S1

## 2021-02-24 NOTE — DISCHARGE NOTE NURSING/CASE MANAGEMENT/SOCIAL WORK - PATIENT PORTAL LINK FT
You can access the FollowMyHealth Patient Portal offered by [facility] by registering at the following website: [email or web address]. By joining [facility]’s FollowMyHealth portal, you will also be able to view your health information using other applications (apps) compatible with our system.

## 2021-02-24 NOTE — PROGRESS NOTE ADULT - ASSESSMENT
A/P: 36yMale POD#1 anterior-posterior fusion L4-S1    Recommendations:  Continue to monitor for ROBF  Continue neurovascular checks  Rest of care per primary team  Vascular surgery will continue to follow

## 2021-02-24 NOTE — DISCHARGE NOTE PROVIDER - HOSPITAL COURSE
Admitted 2/23  Surgery 2/23 Anterior/Posterior Fusion L4-S1  Priscila-op Antibiotics  Pain control  DVT prophylaxis  OOB/Physical Therapy

## 2021-02-24 NOTE — PHYSICAL THERAPY INITIAL EVALUATION ADULT - ADDITIONAL COMMENTS
Patient lives alone in walk up apartment with 2 flights to enter. Does not use any Assistive Devices at baseline.

## 2021-02-24 NOTE — PHYSICAL THERAPY INITIAL EVALUATION ADULT - GAIT DEVIATIONS NOTED, PT EVAL
increased time in double stance/decreased velocity of limb motion/decreased step length/decreased swing-to-stance ratio
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
## 2021-02-24 NOTE — PHYSICAL THERAPY INITIAL EVALUATION ADULT - GENERAL OBSERVATIONS, REHAB EVAL
Patient received semi-griffin in bed  in NAD on RA, +SCDs, +Heplock, +Telemetry. Cleared by PRICILA Yan. Agreeable to PT.

## 2021-02-24 NOTE — DISCHARGE NOTE PROVIDER - NSDCCPCAREPLAN_GEN_ALL_CORE_FT
PRINCIPAL DISCHARGE DIAGNOSIS  Diagnosis: Back pain  Assessment and Plan of Treatment: improvement s/p Anterior/Posterior Fusion L4-S1

## 2021-03-02 LAB — SURGICAL PATHOLOGY STUDY: SIGNIFICANT CHANGE UP

## 2021-03-08 DIAGNOSIS — M51.26 OTHER INTERVERTEBRAL DISC DISPLACEMENT, LUMBAR REGION: ICD-10-CM

## 2021-03-08 DIAGNOSIS — M48.061 SPINAL STENOSIS, LUMBAR REGION WITHOUT NEUROGENIC CLAUDICATION: ICD-10-CM

## 2021-03-08 DIAGNOSIS — D72.829 ELEVATED WHITE BLOOD CELL COUNT, UNSPECIFIED: ICD-10-CM

## 2021-03-08 DIAGNOSIS — Z88.0 ALLERGY STATUS TO PENICILLIN: ICD-10-CM

## 2021-03-08 DIAGNOSIS — E66.01 MORBID (SEVERE) OBESITY DUE TO EXCESS CALORIES: ICD-10-CM

## 2021-03-08 DIAGNOSIS — J45.909 UNSPECIFIED ASTHMA, UNCOMPLICATED: ICD-10-CM

## 2021-03-08 DIAGNOSIS — Z91.013 ALLERGY TO SEAFOOD: ICD-10-CM

## 2021-10-07 NOTE — PRE-OP CHECKLIST - WEIGHT IN KG
Internal Medicine Resident Progress Note     Patient: Rosangela Fragoso Date: 10/7/2021   YOB: 1949 Admission Date: 10/5/2021   MRN: [id number] Attending: Eliana Mo MD     Chief Complaint   Patient presents with   • Dizziness     Patient Active Problem List   Diagnosis   • Varicose veins of lower extremities with other complications   • Essential hypertension   • Generalized anxiety disorder   • Hyperlipidemia, 10-year ASCVD 14.6%   • Monoarticular arthritis   • Morbid obesity (CMS/HCC)   • Pain of left lower leg   • Abdominal pain, acute, epigastric   • GERD (gastroesophageal reflux disease)   • Dyspepsia   • Chronic kidney disease, stage III (moderate) (CMS/HCC)     Rosangela Fragoso is a 71 year old female with PMH of vertigo, HTN, HLD, NICOLE,CAD (last catheterization in 2014 with mild distal LAD disease), CKD stage 3, morbid obesity, and varicose leg veins admitted on 10/5/2021 with lightheadness. Neurostroke code was called in the emergency department, and patient underwent CT head and CTA head and neck without acute findings. Evaluated with teleneurology, and symptoms not felt to be acute stroke. Patient was admitted to the teaching service for observation in stable condition.     Troponin came back elevated at 0.14 with minor EKG changes (possible T-wave flattening) but no symptoms. Cardiology was consulted for further discussion of her EKG, concern for NSTEMI based on elevated cardiac marker. Patient was not started on heparin as her troponin trended downward (0.13 repeated on 10/5/21). Echocardiogram was preformed which showed normal LVEF (68%)  without wall motion abnormality or valvular disease. Per cardiology recommendation, patient scheduled for Lexiscan nuclear stress test over today and tomorrow with plan for disposition pending results.    Neurology was consulted due to concern for TIA as cause of patient's symptoms on presentation. Patient was evaluated by Dr. Shaik Johns with eventual  recommendation for outpatient follow up after MRI head deferred by patient.     Patient was evaluated by both physical therapy and occupational therapy staff with clearance for independent activity as usual upon disposition.     INTERVAL SUBJECTIVE  The patient had no acute overnight events. She denies any recurrence of dizziness or lightheadedness since original presentation to emergency department. She has not required any medications for dizziness while under observation.    Patient reports feeling overall well without dizziness. She notes experiencing transient episode of left medial leg discomfort described as \"jittery\" and \"aching\" sensations similar to past intermittent of the same since scleropathy surgery in 2019. She denies any current pain in leg and states she was able to sleep well last night. She notes normal eating (other than while on NPO protocol), urination, and bowel movements while admitted. Patient expresses mild anxiety regarding possibility of recurrence of dizziness episodes once returned home. She lives with and cares for her mother (92 years old). Patient denies any past use of meclizine at home. She denies headache, chest pain, shortness of breath, abdominal pain, nausea, or vomiting.    OBJECTIVE  Scheduled Medications potassium CHLORIDE, 40 mEq, Once  sodium chloride (PF), 2 mL, 2 times per day  hydrochlorothiazide, 25 mg, Daily  losartan, 50 mg, Daily  enoxaparin, 40 mg, 2 times per day  Potassium Standard Replacement Protocol, , See Admin Instructions  Magnesium Standard Replacement Protocol, , See Admin Instructions  aspirin, 81 mg, Daily  nystatin, , TID  atorvastatin, 40 mg, Daily      PRN Medications sodium chloride, 25 mL, PRN  meclizine, 25 mg, TID PRN  sodium chloride, 500 mL, PRN  sodium chloride, 25 mL, PRN  ondansetron, 4 mg, BID PRN  polyethylene glycol, 17 g, Daily PRN  aluminum-magnesium hydroxide-simethicone, 30 mL, Q4H PRN  acetaminophen, 650 mg, Q4H PRN    Or  acetaminophen, 650 mg, Q4H PRN      Continuous Infusions     Vital Last Value 24 Hour Range   Temperature 97 °F (36.1 °C) (10/07/21 0825) Temp  Min: 96.5 °F (35.8 °C)  Max: 97.9 °F (36.6 °C)   Pulse 68 (10/07/21 0825) Pulse  Min: 68  Max: 79   Respiratory 18 (10/07/21 0825) Resp  Min: 16  Max: 18   Non-Invasive  Blood Pressure (!) 153/70 (10/07/21 0825) BP  Min: 113/53  Max: 162/74   Arterial   Blood Pressure   No data recorded   Pulse Oximetry 96 % (10/07/21 0825) SpO2  Min: 95 %  Max: 97 %     Height/Weight Today Admitted   Weight 103.6 kg (228 lb 4.8 oz) (10/05/21 1041) Weight: 103.6 kg (228 lb 4.8 oz) (10/05/21 1041)   Height N/A Height: 5' 3\" (160 cm) (10/05/21 1041)   BMI N/A BMI (Calculated): 40.44 (10/05/21 1041)     Intake/Output  Last Stool Occurrence:      Intake/Output Summary (Last 24 hours) at 10/7/2021 1259  Last data filed at 10/6/2021 1336  Gross per 24 hour   Intake 120 ml   Output --   Net 120 ml       Active Lines and Nursing Skin Assessments  Peripheral IV 10/05/21 Left Antecubital 18 (Active)   Site Assessment WDL 10/06/21 0900   Dressing Type Transparent 10/06/21 0900   Lumen Cap Applied/Changed On 10/05/21 10/05/21 1800   Line Status Line flushed 10/06/21 0900   Interventions Capped IV 10/06/21 0900       Wound Abdomen Skin fold Moisture Associated Skin Damage (MASD) (Active)   Wound Exudate Mild odor 10/06/21 0750   Cleansing Agent Soap and water 10/06/21 0750   Wound Bed/Tissue Type Intact;Red;Pink 10/06/21 0750   Periwound Condition Moist 10/06/21 0750   Topical Agent Antifungal powder 10/06/21 0750       Skin Abnormality Groin Right (Active)       PHYSICAL EXAM  General: Alert, cooperative, conversive. No acute distress.  Skin: Warm, dry. Mild erythema present between skin folds of abdominal area.  HEENT:EOMI. PERRL. The sclerae and conjunctivae are normal bilaterally.  The soft palate is symmetrical without lesion.  The posterior pharynx is without lesion, edema or erythema.  Neck:   The trachea is midline.  No cervical or supraclavicular lymphadenopathy.  Respiratory: Bilaterally clear to auscultation.  Non-labored respirations.  Normal respiratory effort.  Cardiovascular: Regular rate and rhythm and S1, S2 present.  Abdomen: Soft, non-tender, non-distended. Positive bowel sounds all quadrants. No guarding or rebound. No hepatomegaly or splenomegaly.   Extremities and Spine:  No edema.  No deformity.  No tenderness..   Back:  Normal alignment. No CVA  or midline bony tenderness.    Neuro:  Alert, oriented x4.  Speech intact.  No dysphasia or dysarthria.  Symmetrical facial structures.  Strength 5/5 all extremities.  Sensation intact to light touch.  Psych:  Affect is appropriate with normal social interaction.    LABORATORY RESULTS (24 hour)  Recent Results (from the past 24 hour(s))   Basic Metabolic Panel    Collection Time: 10/07/21  4:44 AM   Result Value Ref Range    Fasting Status      Sodium 139 135 - 145 mmol/L    Potassium 4.0 3.4 - 5.1 mmol/L    Chloride 104 98 - 107 mmol/L    Carbon Dioxide 30 21 - 32 mmol/L    Anion Gap 9 (L) 10 - 20 mmol/L    Glucose 95 70 - 99 mg/dL    BUN 20 6 - 20 mg/dL    Creatinine 1.00 (H) 0.51 - 0.95 mg/dL    Glomerular Filtration Rate 57 (L) >=60    BUN/ Creatinine Ratio 20 7 - 25    Calcium 8.2 (L) 8.4 - 10.2 mg/dL   CBC No Differential    Collection Time: 10/07/21  4:44 AM   Result Value Ref Range    WBC 6.4 4.2 - 11.0 K/mcL    RBC 3.93 (L) 4.00 - 5.20 mil/mcL    HGB 11.8 (L) 12.0 - 15.5 g/dL    HCT 36.0 36.0 - 46.5 %    MCV 91.6 78.0 - 100.0 fl    MCH 30.0 26.0 - 34.0 pg    MCHC 32.8 32.0 - 36.5 g/dL     140 - 450 K/mcL    RDW-CV 13.2 11.0 - 15.0 %    RDW-SD 44.9 39.0 - 50.0 fL    NRBC 0 <=0 /100 WBC   Stress test    Collection Time: 10/07/21 11:03 AM   Result Value Ref Range    REPORT TEXT monitored by Dr. Alva      EKG (Most recent)  Results for orders placed or performed during the hospital encounter of 10/05/21   Electrocardiogram 12-Lead    Result Value Ref Range    Ventricular Rate EKG/Min (BPM) 58     Atrial Rate (BPM) 58     ME-Interval (MSEC) 180     QRS-Interval (MSEC) 84     QT-Interval (MSEC) 414     QTc 407     P Axis (Degrees) 51     R Axis (Degrees) -10     T Axis (Degrees) 25     REPORT TEXT       Sinus bradycardia  Otherwise normal ECG  When compared with ECG of  22-APR-2020 03:29,  Vent. rate  has decreased  BY  36 BPM  Confirmed by ZEUS LAGOS MD ([id number]),  Blanca Nicole ([id number]) on 10/6/2021 7:06:37 AM       IMAGING  CT Angio Head and Neck Level 1   Final Result   IMPRESSION:       1. Unremarkable arterial vasculature of the head and neck.      2. Mild age-appropriate central and cortical atrophy and mild probable   chronic ischemic microvascular changes.      3. Degenerative changes of the cervical spine, as described above.      CT Head Level 1   Final Result   IMPRESSION: Negative.      Results were called to Aurelio Marlow PA-C,at 1008 hours on10/5/2021         ECHO (1228 on 10/6/2021):  Upper normal left ventricular wall thickness. LVEF = 68 %. No regional wall motion abnormalities. E/e' septal = 12.  Normal right ventricular size and systolic function.  No significant valve abnormalities.  No pericardial effusion.  Definity contrast was utilized to better visualize the endocardial definition.    MICROBIOLOGY    Quality Indicators   AMI With Heart Failure with Reduced LVEF (<40%)? no  Heart failure?  No  Stroke measures indicated? No  AMI? No  DVT/VTE Prophylaxis:  VTE Pharmacologic Prophylaxis: Yes  VTE Mechanical Prophylaxis: Yes  Severe sepsis with septic shock?  No    ASSESSMENT AND PLAN  Rosangela Fragoso is a 71 year old female with PMH of vertigo, HTN, HLD, NICOLE,CAD (last catheterization in 2014), CKD stage 3, and morbid obesity admitted on 10/5/2021 with lightheadness.     Further evaluation and work up as follows:     Lightheadedness; improved  Dizziness; improved  Patient with history of dizziness, more severe this time.  130.6 Workup for central nervous lesion unremarkable, and workup for cardiac cause unremarkable thus far.  Most consistent with vestibular cause.  Concern for TIA  - Neuro stroke called in the emergency department, negative CT, CTA  - Neurology consulted, appreciate recommendations  - After discussion with Dr. Johns, MRI brain deferred, as symptoms have resolved  - Dr. Johns discussed primary stroke prevention and risk factor modification with patient   - Daily aspirin 81 mg  - Continue q4h neurochecks  Concern for cardiac origin  - Troponin elevated to 0.14, trended down to 0.13  - EKG with T-wave flattening, discussed EKG with cardiology fellow  - Cardiology consulted, appreciate recommendations  - Transthoracic echo without reduced ejection fraction or wall motion abnormality  - Lexiscan nuclear stress started today to be completed tomorrow, as ordered by cardiology  - Meclizine available     Hypertension; controlled  - Continue PTA losartan 50 mg, hydrochlorothiazide 25 mg  - Elevated blood pressure of 153/70 mmHg this morning measured concurrently with administration of anti-hypertensive medications. Will recheck blood pressure tomorrow to determine necessity for further intervention.    CKD stage 3; baseline creatinine: 0.9-1.1, eGFR 50-66  - Creatinine 1.0, eGFR 57 on repeat BMP (10/7/21)  - Avoid nephrotoxins      Impaired glucose tolerance  - Hemoglobin A1c 5.7 (10/5/21)  - Glucose improved to 95 on repeat BMP (10/7/21)    Hyperlipidemia; well controlled  - Transitioned to high-dose atorvastatin (40mg daily) from low-dose atorvastatin given concern for stroke  - Lipid panel with excellent profile    Skin fold erythema  - Suspect moisture-associated damage, as assessed and described by nursing staff  - Continue daily topical nystatin powder administration while admitted    BEST PRACTICES     Fluids: none   Isolation: none  DM: no DM, daily check  Electrolyte PRN's: yes  Nutrition: PO Cardiac  Pain: currently  none  N/V: currently none  PT/OT: consulted  Disposition: Observation    Signed,  Wilmer Gtz, MS3  10/7/2021 12:59 PM    The patient's history and physical were discussed with Dr. Eliana Mo MD, who upon seeing the patient agreed with the above assessment and plan.

## 2021-12-29 NOTE — PHYSICAL THERAPY INITIAL EVALUATION ADULT - ADL SKILLS, REHAB EVAL
Care Management    CM spoke with patient via phone for patient assessment. Patient admitted for hypoglycemia and was positive for Covid. Patient lives with his wife and is independent of all ADLS. No needs identified for patient during patient assessment. CM to follow.   
independent

## 2022-06-17 VITALS — WEIGHT: 303 LBS | SYSTOLIC BLOOD PRESSURE: 120 MMHG | DIASTOLIC BLOOD PRESSURE: 80 MMHG

## 2023-02-12 ENCOUNTER — NON-APPOINTMENT (OUTPATIENT)
Age: 38
End: 2023-02-12

## 2023-03-30 ENCOUNTER — NON-APPOINTMENT (OUTPATIENT)
Age: 38
End: 2023-03-30

## 2023-03-30 DIAGNOSIS — Z91.013 ALLERGY TO SEAFOOD: ICD-10-CM

## 2023-03-30 DIAGNOSIS — J06.9 ACUTE UPPER RESPIRATORY INFECTION, UNSPECIFIED: ICD-10-CM

## 2023-03-30 DIAGNOSIS — M54.50 LOW BACK PAIN, UNSPECIFIED: ICD-10-CM

## 2023-03-30 DIAGNOSIS — Z82.69 FAMILY HISTORY OF OTHER DISEASES OF THE MUSCULOSKELETAL SYSTEM AND CONNECTIVE TISSUE: ICD-10-CM

## 2023-03-30 DIAGNOSIS — Z78.9 OTHER SPECIFIED HEALTH STATUS: ICD-10-CM

## 2023-03-30 DIAGNOSIS — M51.37 OTHER INTERVERTEBRAL DISC DEGENERATION, LUMBOSACRAL REGION: ICD-10-CM

## 2023-03-30 DIAGNOSIS — Z87.2 PERSONAL HISTORY OF DISEASES OF THE SKIN AND SUBCUTANEOUS TISSUE: ICD-10-CM

## 2023-03-30 DIAGNOSIS — R07.9 CHEST PAIN, UNSPECIFIED: ICD-10-CM

## 2023-03-30 DIAGNOSIS — Z82.49 FAMILY HISTORY OF ISCHEMIC HEART DISEASE AND OTHER DISEASES OF THE CIRCULATORY SYSTEM: ICD-10-CM

## 2023-03-30 DIAGNOSIS — G89.29 LOW BACK PAIN, UNSPECIFIED: ICD-10-CM

## 2023-05-05 PROBLEM — M54.9 DORSALGIA, UNSPECIFIED: Chronic | Status: ACTIVE | Noted: 2021-02-22

## 2023-05-26 ENCOUNTER — APPOINTMENT (OUTPATIENT)
Dept: INTERNAL MEDICINE | Facility: CLINIC | Age: 38
End: 2023-05-26
Payer: COMMERCIAL

## 2023-05-26 VITALS
OXYGEN SATURATION: 98 % | TEMPERATURE: 97.6 F | WEIGHT: 306.8 LBS | BODY MASS INDEX: 40.66 KG/M2 | HEIGHT: 73 IN | DIASTOLIC BLOOD PRESSURE: 90 MMHG | HEART RATE: 81 BPM | RESPIRATION RATE: 18 BRPM | SYSTOLIC BLOOD PRESSURE: 145 MMHG

## 2023-05-26 DIAGNOSIS — E66.01 MORBID (SEVERE) OBESITY DUE TO EXCESS CALORIES: ICD-10-CM

## 2023-05-26 DIAGNOSIS — T78.2XXD ANAPHYLACTIC SHOCK, UNSPECIFIED, SUBSEQUENT ENCOUNTER: ICD-10-CM

## 2023-05-26 PROCEDURE — G0447 BEHAVIOR COUNSEL OBESITY 15M: CPT | Mod: 59

## 2023-05-26 PROCEDURE — 99214 OFFICE O/P EST MOD 30 MIN: CPT | Mod: 25

## 2023-05-26 PROCEDURE — 94010 BREATHING CAPACITY TEST: CPT

## 2023-05-26 RX ORDER — EPINEPHRINE 0.3 MG/.3ML
0.3 INJECTION INTRAMUSCULAR
Qty: 2 | Refills: 1 | Status: ACTIVE | COMMUNITY
Start: 1900-01-01 | End: 1900-01-01

## 2023-05-26 RX ORDER — MONTELUKAST SODIUM 10 MG/1
10 TABLET, FILM COATED ORAL
Refills: 0 | Status: DISCONTINUED | COMMUNITY
End: 2023-05-26

## 2023-05-31 LAB
ALBUMIN SERPL ELPH-MCNC: 4.3 G/DL
ALP BLD-CCNC: 67 U/L
ALT SERPL-CCNC: 23 U/L
ANION GAP SERPL CALC-SCNC: 14 MMOL/L
AST SERPL-CCNC: 18 U/L
BILIRUB SERPL-MCNC: 0.4 MG/DL
BUN SERPL-MCNC: 12 MG/DL
CALCIUM SERPL-MCNC: 9.4 MG/DL
CHLORIDE SERPL-SCNC: 103 MMOL/L
CHOLEST SERPL-MCNC: 134 MG/DL
CO2 SERPL-SCNC: 26 MMOL/L
CREAT SERPL-MCNC: 1.33 MG/DL
EGFR: 70 ML/MIN/1.73M2
ESTIMATED AVERAGE GLUCOSE: 114 MG/DL
GLUCOSE SERPL-MCNC: 102 MG/DL
HBA1C MFR BLD HPLC: 5.6 %
HDLC SERPL-MCNC: 46 MG/DL
LDLC SERPL CALC-MCNC: 67 MG/DL
NONHDLC SERPL-MCNC: 88 MG/DL
POTASSIUM SERPL-SCNC: 4.5 MMOL/L
PROT SERPL-MCNC: 6.9 G/DL
SODIUM SERPL-SCNC: 143 MMOL/L
TRIGL SERPL-MCNC: 106 MG/DL

## 2023-05-31 NOTE — REVIEW OF SYSTEMS
[Fever] : no fever [Chills] : no chills [Pain] : no pain [Earache] : no earache [Sore Throat] : no sore throat [Chest Pain] : no chest pain [Palpitations] : no palpitations [Shortness Of Breath] : no shortness of breath [Wheezing] : no wheezing [Cough] : no cough [Nausea] : no nausea [Constipation] : no constipation [Vomiting] : no vomiting [Dysuria] : no dysuria [Frequency] : no frequency [Joint Pain] : no joint pain [Itching] : no itching [Skin Rash] : no skin rash [Headache] : no headache [Dizziness] : no dizziness

## 2023-05-31 NOTE — PHYSICAL EXAM
[No Acute Distress] : no acute distress [EOMI] : extraocular movements intact [Normal TMs] : both tympanic membranes were normal [No Lymphadenopathy] : no lymphadenopathy [No Respiratory Distress] : no respiratory distress  [Normal Rate] : normal rate  [Regular Rhythm] : with a regular rhythm [No Carotid Bruits] : no carotid bruits [Soft] : abdomen soft [Non Tender] : non-tender [No Rash] : no rash [No Focal Deficits] : no focal deficits [de-identified] : +1 edema bilat

## 2023-05-31 NOTE — ASSESSMENT
[FreeTextEntry1] : Labs\par PFT -\par Discussed Low na sat fat refined CHO diet\par Advised resuming Immunotherapy\par All questions answered\par Re check 3 months

## 2023-05-31 NOTE — HISTORY OF PRESENT ILLNESS
[FreeTextEntry1] : Pt here for yearly check up \par Notes nocturia x 2 months, nl color no odor\par No fever chills cough or wheeze\par No additional complaints\par denies any sob/wheezing/n/v/d/ha/ chest pain/ chest palpations or dizziness.  [de-identified] : Rhinitis\par Asthma\par Nocturia

## 2023-06-22 ENCOUNTER — NON-APPOINTMENT (OUTPATIENT)
Age: 38
End: 2023-06-22

## 2023-07-03 ENCOUNTER — TRANSCRIPTION ENCOUNTER (OUTPATIENT)
Age: 38
End: 2023-07-03

## 2023-10-07 ENCOUNTER — NON-APPOINTMENT (OUTPATIENT)
Age: 38
End: 2023-10-07

## 2023-10-11 ENCOUNTER — APPOINTMENT (OUTPATIENT)
Dept: INTERNAL MEDICINE | Facility: CLINIC | Age: 38
End: 2023-10-11
Payer: COMMERCIAL

## 2023-10-11 VITALS
DIASTOLIC BLOOD PRESSURE: 83 MMHG | BODY MASS INDEX: 40.82 KG/M2 | WEIGHT: 308 LBS | HEIGHT: 73 IN | RESPIRATION RATE: 18 BRPM | SYSTOLIC BLOOD PRESSURE: 123 MMHG | OXYGEN SATURATION: 98 % | HEART RATE: 88 BPM | TEMPERATURE: 97.3 F

## 2023-10-11 PROCEDURE — 99214 OFFICE O/P EST MOD 30 MIN: CPT | Mod: 25

## 2023-10-11 PROCEDURE — 94010 BREATHING CAPACITY TEST: CPT

## 2023-10-11 RX ORDER — AZITHROMYCIN 250 MG/1
250 TABLET, FILM COATED ORAL
Qty: 6 | Refills: 0 | Status: ACTIVE | COMMUNITY
Start: 2023-10-11 | End: 1900-01-01

## 2023-10-20 ENCOUNTER — APPOINTMENT (OUTPATIENT)
Dept: INTERNAL MEDICINE | Facility: CLINIC | Age: 38
End: 2023-10-20
Payer: COMMERCIAL

## 2023-10-20 VITALS
SYSTOLIC BLOOD PRESSURE: 142 MMHG | OXYGEN SATURATION: 97 % | HEART RATE: 76 BPM | TEMPERATURE: 97.9 F | WEIGHT: 312 LBS | RESPIRATION RATE: 18 BRPM | BODY MASS INDEX: 41.35 KG/M2 | HEIGHT: 73 IN | DIASTOLIC BLOOD PRESSURE: 84 MMHG

## 2023-10-20 VITALS — SYSTOLIC BLOOD PRESSURE: 130 MMHG | DIASTOLIC BLOOD PRESSURE: 88 MMHG

## 2023-10-20 PROCEDURE — 99213 OFFICE O/P EST LOW 20 MIN: CPT

## 2023-10-20 RX ORDER — LEVOFLOXACIN 750 MG/1
750 TABLET, FILM COATED ORAL DAILY
Qty: 7 | Refills: 0 | Status: ACTIVE | COMMUNITY
Start: 2023-10-20 | End: 1900-01-01

## 2023-10-20 RX ORDER — METHYLPREDNISOLONE 4 MG/1
4 TABLET ORAL
Qty: 1 | Refills: 0 | Status: ACTIVE | COMMUNITY
Start: 2023-10-20 | End: 1900-01-01

## 2023-11-01 ENCOUNTER — APPOINTMENT (OUTPATIENT)
Dept: INTERNAL MEDICINE | Facility: CLINIC | Age: 38
End: 2023-11-01
Payer: COMMERCIAL

## 2023-11-01 VITALS
RESPIRATION RATE: 18 BRPM | HEART RATE: 77 BPM | WEIGHT: 315 LBS | TEMPERATURE: 97.8 F | BODY MASS INDEX: 41.75 KG/M2 | OXYGEN SATURATION: 95 % | DIASTOLIC BLOOD PRESSURE: 83 MMHG | SYSTOLIC BLOOD PRESSURE: 141 MMHG | HEIGHT: 73 IN

## 2023-11-01 VITALS — SYSTOLIC BLOOD PRESSURE: 112 MMHG | DIASTOLIC BLOOD PRESSURE: 72 MMHG

## 2023-11-01 DIAGNOSIS — J40 BRONCHITIS, NOT SPECIFIED AS ACUTE OR CHRONIC: ICD-10-CM

## 2023-11-01 DIAGNOSIS — Z23 ENCOUNTER FOR IMMUNIZATION: ICD-10-CM

## 2023-11-01 DIAGNOSIS — J45.909 UNSPECIFIED ASTHMA, UNCOMPLICATED: ICD-10-CM

## 2023-11-01 PROCEDURE — G0008: CPT | Mod: 59

## 2023-11-01 PROCEDURE — 99213 OFFICE O/P EST LOW 20 MIN: CPT | Mod: 25

## 2023-11-01 PROCEDURE — 90686 IIV4 VACC NO PRSV 0.5 ML IM: CPT

## 2023-11-01 RX ORDER — FLUTICASONE FUROATE AND VILANTEROL TRIFENATATE 200; 25 UG/1; UG/1
200-25 POWDER RESPIRATORY (INHALATION)
Qty: 3 | Refills: 1 | Status: ACTIVE | COMMUNITY
Start: 1900-01-01 | End: 1900-01-01

## 2023-11-01 RX ORDER — UMECLIDINIUM 62.5 UG/1
62.5 AEROSOL, POWDER ORAL
Qty: 90 | Refills: 1 | Status: ACTIVE | COMMUNITY
Start: 1900-01-01 | End: 1900-01-01

## 2024-04-24 ENCOUNTER — APPOINTMENT (OUTPATIENT)
Dept: INTERNAL MEDICINE | Facility: CLINIC | Age: 39
End: 2024-04-24
Payer: COMMERCIAL

## 2024-04-24 PROCEDURE — 95165 ANTIGEN THERAPY SERVICES: CPT

## 2024-04-24 PROCEDURE — 95117 IMMUNOTHERAPY INJECTIONS: CPT

## 2024-05-01 ENCOUNTER — APPOINTMENT (OUTPATIENT)
Dept: INTERNAL MEDICINE | Facility: CLINIC | Age: 39
End: 2024-05-01
Payer: COMMERCIAL

## 2024-05-01 PROCEDURE — 95165 ANTIGEN THERAPY SERVICES: CPT

## 2024-05-01 PROCEDURE — 95117 IMMUNOTHERAPY INJECTIONS: CPT

## 2024-05-10 ENCOUNTER — APPOINTMENT (OUTPATIENT)
Dept: INTERNAL MEDICINE | Facility: CLINIC | Age: 39
End: 2024-05-10
Payer: COMMERCIAL

## 2024-05-10 PROCEDURE — 95165 ANTIGEN THERAPY SERVICES: CPT

## 2024-05-10 PROCEDURE — 95117 IMMUNOTHERAPY INJECTIONS: CPT

## 2024-05-20 ENCOUNTER — APPOINTMENT (OUTPATIENT)
Dept: INTERNAL MEDICINE | Facility: CLINIC | Age: 39
End: 2024-05-20
Payer: COMMERCIAL

## 2024-05-20 PROCEDURE — 95117 IMMUNOTHERAPY INJECTIONS: CPT

## 2024-05-20 PROCEDURE — 95165 ANTIGEN THERAPY SERVICES: CPT

## 2024-05-29 ENCOUNTER — APPOINTMENT (OUTPATIENT)
Dept: INTERNAL MEDICINE | Facility: CLINIC | Age: 39
End: 2024-05-29
Payer: COMMERCIAL

## 2024-05-29 PROCEDURE — 95117 IMMUNOTHERAPY INJECTIONS: CPT

## 2024-05-29 PROCEDURE — 95165 ANTIGEN THERAPY SERVICES: CPT

## 2024-06-07 ENCOUNTER — APPOINTMENT (OUTPATIENT)
Dept: INTERNAL MEDICINE | Facility: CLINIC | Age: 39
End: 2024-06-07
Payer: COMMERCIAL

## 2024-06-07 PROCEDURE — 95117 IMMUNOTHERAPY INJECTIONS: CPT

## 2024-06-07 PROCEDURE — 95165 ANTIGEN THERAPY SERVICES: CPT

## 2024-06-14 ENCOUNTER — APPOINTMENT (OUTPATIENT)
Dept: INTERNAL MEDICINE | Facility: CLINIC | Age: 39
End: 2024-06-14
Payer: COMMERCIAL

## 2024-06-14 PROCEDURE — 95165 ANTIGEN THERAPY SERVICES: CPT

## 2024-06-14 PROCEDURE — 95117 IMMUNOTHERAPY INJECTIONS: CPT

## 2024-06-21 ENCOUNTER — APPOINTMENT (OUTPATIENT)
Dept: INTERNAL MEDICINE | Facility: CLINIC | Age: 39
End: 2024-06-21
Payer: COMMERCIAL

## 2024-06-21 PROCEDURE — 95117 IMMUNOTHERAPY INJECTIONS: CPT

## 2024-06-21 PROCEDURE — 95165 ANTIGEN THERAPY SERVICES: CPT

## 2024-06-28 ENCOUNTER — APPOINTMENT (OUTPATIENT)
Dept: INTERNAL MEDICINE | Facility: CLINIC | Age: 39
End: 2024-06-28
Payer: COMMERCIAL

## 2024-06-28 DIAGNOSIS — J30.9 ALLERGIC RHINITIS, UNSPECIFIED: ICD-10-CM

## 2024-06-28 PROCEDURE — 95165 ANTIGEN THERAPY SERVICES: CPT

## 2024-06-28 PROCEDURE — 95117 IMMUNOTHERAPY INJECTIONS: CPT

## 2024-07-12 ENCOUNTER — APPOINTMENT (OUTPATIENT)
Dept: INTERNAL MEDICINE | Facility: CLINIC | Age: 39
End: 2024-07-12
Payer: COMMERCIAL

## 2024-07-12 PROCEDURE — 95117 IMMUNOTHERAPY INJECTIONS: CPT

## 2024-07-12 PROCEDURE — 95165 ANTIGEN THERAPY SERVICES: CPT

## 2024-07-19 ENCOUNTER — APPOINTMENT (OUTPATIENT)
Dept: INTERNAL MEDICINE | Facility: CLINIC | Age: 39
End: 2024-07-19
Payer: COMMERCIAL

## 2024-07-19 DIAGNOSIS — J30.9 ALLERGIC RHINITIS, UNSPECIFIED: ICD-10-CM

## 2024-07-19 PROCEDURE — 95165 ANTIGEN THERAPY SERVICES: CPT

## 2024-07-19 PROCEDURE — 95117 IMMUNOTHERAPY INJECTIONS: CPT

## 2024-07-29 ENCOUNTER — APPOINTMENT (OUTPATIENT)
Dept: INTERNAL MEDICINE | Facility: CLINIC | Age: 39
End: 2024-07-29
Payer: COMMERCIAL

## 2024-07-29 DIAGNOSIS — J30.9 ALLERGIC RHINITIS, UNSPECIFIED: ICD-10-CM

## 2024-07-29 PROCEDURE — 95117 IMMUNOTHERAPY INJECTIONS: CPT

## 2024-07-29 PROCEDURE — 95165 ANTIGEN THERAPY SERVICES: CPT

## 2024-08-16 ENCOUNTER — APPOINTMENT (OUTPATIENT)
Dept: INTERNAL MEDICINE | Facility: CLINIC | Age: 39
End: 2024-08-16
Payer: COMMERCIAL

## 2024-08-16 DIAGNOSIS — J30.9 ALLERGIC RHINITIS, UNSPECIFIED: ICD-10-CM

## 2024-08-16 PROCEDURE — 95117 IMMUNOTHERAPY INJECTIONS: CPT

## 2024-08-16 PROCEDURE — 95165 ANTIGEN THERAPY SERVICES: CPT

## 2024-08-21 NOTE — PATIENT PROFILE ADULT - DO YOU NEED ADDITIONAL SERVICES TO MANAGE ANY OF THESE MEDICAL CONDITIONS AT HOME?
Problem: Pain  Goal: # Acceptable pain/comfort level is achieved/maintained at rest using age and developmentally appropriate pediatric pain scale (NRS, FACES, FLACC, NPASS)  Outcome: Monitoring/Evaluating progress  Goal: # Patient and/or caregiver verbalizes understanding of pain management  Description: Documented in Patient Education Activity  Outcome: Monitoring/Evaluating progress     
no

## 2024-08-30 ENCOUNTER — APPOINTMENT (OUTPATIENT)
Dept: INTERNAL MEDICINE | Facility: CLINIC | Age: 39
End: 2024-08-30
Payer: COMMERCIAL

## 2024-08-30 DIAGNOSIS — J30.9 ALLERGIC RHINITIS, UNSPECIFIED: ICD-10-CM

## 2024-08-30 PROCEDURE — 95165 ANTIGEN THERAPY SERVICES: CPT

## 2024-08-30 PROCEDURE — 95117 IMMUNOTHERAPY INJECTIONS: CPT

## 2024-09-13 ENCOUNTER — APPOINTMENT (OUTPATIENT)
Dept: INTERNAL MEDICINE | Facility: CLINIC | Age: 39
End: 2024-09-13
Payer: COMMERCIAL

## 2024-09-13 PROCEDURE — 95165 ANTIGEN THERAPY SERVICES: CPT

## 2024-09-13 PROCEDURE — 95117 IMMUNOTHERAPY INJECTIONS: CPT

## 2024-09-20 ENCOUNTER — APPOINTMENT (OUTPATIENT)
Dept: INTERNAL MEDICINE | Facility: CLINIC | Age: 39
End: 2024-09-20
Payer: COMMERCIAL

## 2024-09-20 DIAGNOSIS — J30.9 ALLERGIC RHINITIS, UNSPECIFIED: ICD-10-CM

## 2024-09-20 PROCEDURE — 95165 ANTIGEN THERAPY SERVICES: CPT

## 2024-09-20 PROCEDURE — 95117 IMMUNOTHERAPY INJECTIONS: CPT

## 2024-11-01 ENCOUNTER — APPOINTMENT (OUTPATIENT)
Dept: INTERNAL MEDICINE | Facility: CLINIC | Age: 39
End: 2024-11-01
Payer: COMMERCIAL

## 2024-11-01 PROCEDURE — 95165 ANTIGEN THERAPY SERVICES: CPT

## 2024-11-01 PROCEDURE — 95117 IMMUNOTHERAPY INJECTIONS: CPT

## 2024-11-08 ENCOUNTER — APPOINTMENT (OUTPATIENT)
Dept: INTERNAL MEDICINE | Facility: CLINIC | Age: 39
End: 2024-11-08

## 2024-11-08 ENCOUNTER — NON-APPOINTMENT (OUTPATIENT)
Age: 39
End: 2024-11-08

## 2024-11-08 PROCEDURE — G0008: CPT

## 2024-11-08 PROCEDURE — 95117 IMMUNOTHERAPY INJECTIONS: CPT

## 2024-11-08 PROCEDURE — 95165 ANTIGEN THERAPY SERVICES: CPT

## 2024-11-08 PROCEDURE — 90656 IIV3 VACC NO PRSV 0.5 ML IM: CPT

## 2024-11-15 ENCOUNTER — APPOINTMENT (OUTPATIENT)
Dept: INTERNAL MEDICINE | Facility: CLINIC | Age: 39
End: 2024-11-15
Payer: COMMERCIAL

## 2024-11-15 PROCEDURE — 95165 ANTIGEN THERAPY SERVICES: CPT

## 2024-11-15 PROCEDURE — 95117 IMMUNOTHERAPY INJECTIONS: CPT

## 2024-11-22 ENCOUNTER — APPOINTMENT (OUTPATIENT)
Dept: INTERNAL MEDICINE | Facility: CLINIC | Age: 39
End: 2024-11-22
Payer: COMMERCIAL

## 2024-11-22 DIAGNOSIS — J30.9 ALLERGIC RHINITIS, UNSPECIFIED: ICD-10-CM

## 2024-11-22 PROCEDURE — 95117 IMMUNOTHERAPY INJECTIONS: CPT

## 2024-11-22 PROCEDURE — 95165 ANTIGEN THERAPY SERVICES: CPT

## 2024-12-02 ENCOUNTER — APPOINTMENT (OUTPATIENT)
Dept: INTERNAL MEDICINE | Facility: CLINIC | Age: 39
End: 2024-12-02
Payer: COMMERCIAL

## 2024-12-02 PROCEDURE — 95165 ANTIGEN THERAPY SERVICES: CPT

## 2024-12-02 PROCEDURE — 95117 IMMUNOTHERAPY INJECTIONS: CPT

## 2024-12-13 ENCOUNTER — APPOINTMENT (OUTPATIENT)
Dept: INTERNAL MEDICINE | Facility: CLINIC | Age: 39
End: 2024-12-13
Payer: COMMERCIAL

## 2024-12-13 PROCEDURE — 95117 IMMUNOTHERAPY INJECTIONS: CPT

## 2024-12-13 PROCEDURE — 95165 ANTIGEN THERAPY SERVICES: CPT

## 2024-12-20 ENCOUNTER — APPOINTMENT (OUTPATIENT)
Dept: INTERNAL MEDICINE | Facility: CLINIC | Age: 39
End: 2024-12-20
Payer: COMMERCIAL

## 2024-12-20 DIAGNOSIS — J30.9 ALLERGIC RHINITIS, UNSPECIFIED: ICD-10-CM

## 2024-12-20 PROCEDURE — 95117 IMMUNOTHERAPY INJECTIONS: CPT

## 2024-12-20 PROCEDURE — 95165 ANTIGEN THERAPY SERVICES: CPT

## 2025-01-03 ENCOUNTER — APPOINTMENT (OUTPATIENT)
Dept: INTERNAL MEDICINE | Facility: CLINIC | Age: 40
End: 2025-01-03
Payer: COMMERCIAL

## 2025-01-03 DIAGNOSIS — J30.9 ALLERGIC RHINITIS, UNSPECIFIED: ICD-10-CM

## 2025-01-03 PROCEDURE — 95117 IMMUNOTHERAPY INJECTIONS: CPT

## 2025-01-03 PROCEDURE — 95165 ANTIGEN THERAPY SERVICES: CPT

## 2025-01-15 ENCOUNTER — APPOINTMENT (OUTPATIENT)
Dept: INTERNAL MEDICINE | Facility: CLINIC | Age: 40
End: 2025-01-15

## 2025-01-27 ENCOUNTER — APPOINTMENT (OUTPATIENT)
Dept: INTERNAL MEDICINE | Facility: CLINIC | Age: 40
End: 2025-01-27
Payer: COMMERCIAL

## 2025-01-27 VITALS — SYSTOLIC BLOOD PRESSURE: 126 MMHG | DIASTOLIC BLOOD PRESSURE: 84 MMHG

## 2025-01-27 VITALS
HEIGHT: 73 IN | SYSTOLIC BLOOD PRESSURE: 129 MMHG | RESPIRATION RATE: 18 BRPM | DIASTOLIC BLOOD PRESSURE: 63 MMHG | OXYGEN SATURATION: 98 % | BODY MASS INDEX: 40.56 KG/M2 | HEART RATE: 71 BPM | WEIGHT: 306 LBS | TEMPERATURE: 97.1 F

## 2025-01-27 DIAGNOSIS — E66.01 MORBID (SEVERE) OBESITY DUE TO EXCESS CALORIES: ICD-10-CM

## 2025-01-27 DIAGNOSIS — Z00.00 ENCOUNTER FOR GENERAL ADULT MEDICAL EXAMINATION W/OUT ABNORMAL FINDINGS: ICD-10-CM

## 2025-01-27 PROCEDURE — 94060 EVALUATION OF WHEEZING: CPT

## 2025-01-27 PROCEDURE — 99214 OFFICE O/P EST MOD 30 MIN: CPT | Mod: 25

## 2025-01-28 LAB
ALBUMIN SERPL ELPH-MCNC: 4.1 G/DL
ALP BLD-CCNC: 75 U/L
ALT SERPL-CCNC: 24 U/L
ANION GAP SERPL CALC-SCNC: 14 MMOL/L
APPEARANCE: CLEAR
AST SERPL-CCNC: 26 U/L
BACTERIA: NEGATIVE /HPF
BILIRUB SERPL-MCNC: 0.4 MG/DL
BILIRUBIN URINE: NEGATIVE
BLOOD URINE: NEGATIVE
BUN SERPL-MCNC: 9 MG/DL
CALCIUM SERPL-MCNC: 9.3 MG/DL
CAST: 0 /LPF
CHLORIDE SERPL-SCNC: 105 MMOL/L
CHOLEST SERPL-MCNC: 122 MG/DL
CO2 SERPL-SCNC: 22 MMOL/L
COLOR: YELLOW
CREAT SERPL-MCNC: 1.22 MG/DL
EGFR: 77 ML/MIN/1.73M2
EPITHELIAL CELLS: 0 /HPF
GLUCOSE QUALITATIVE U: NEGATIVE MG/DL
GLUCOSE SERPL-MCNC: 96 MG/DL
HCT VFR BLD CALC: 45 %
HDLC SERPL-MCNC: 45 MG/DL
HGB BLD-MCNC: 14.6 G/DL
KETONES URINE: NEGATIVE MG/DL
LDLC SERPL CALC-MCNC: 60 MG/DL
LEUKOCYTE ESTERASE URINE: NEGATIVE
MCHC RBC-ENTMCNC: 28.7 PG
MCHC RBC-ENTMCNC: 32.4 G/DL
MCV RBC AUTO: 88.6 FL
MICROSCOPIC-UA: NORMAL
NITRITE URINE: NEGATIVE
NONHDLC SERPL-MCNC: 77 MG/DL
PH URINE: 6
PLATELET # BLD AUTO: 278 K/UL
POTASSIUM SERPL-SCNC: 4.4 MMOL/L
PROT SERPL-MCNC: 6.9 G/DL
PROTEIN URINE: NEGATIVE MG/DL
RBC # BLD: 5.08 M/UL
RBC # FLD: 13.2 %
RED BLOOD CELLS URINE: 1 /HPF
SODIUM SERPL-SCNC: 142 MMOL/L
SPECIFIC GRAVITY URINE: 1.02
TRIGL SERPL-MCNC: 87 MG/DL
UROBILINOGEN URINE: 1 MG/DL
WBC # FLD AUTO: 4.31 K/UL
WHITE BLOOD CELLS URINE: 0 /HPF

## 2025-01-29 ENCOUNTER — APPOINTMENT (OUTPATIENT)
Dept: INTERNAL MEDICINE | Facility: CLINIC | Age: 40
End: 2025-01-29
Payer: COMMERCIAL

## 2025-01-29 PROCEDURE — 95117 IMMUNOTHERAPY INJECTIONS: CPT

## 2025-01-29 PROCEDURE — 95165 ANTIGEN THERAPY SERVICES: CPT

## 2025-01-29 RX ORDER — ALBUTEROL SULFATE 90 UG/1
108 (90 BASE) INHALANT RESPIRATORY (INHALATION)
Qty: 30 | Refills: 1 | Status: ACTIVE | COMMUNITY
Start: 2025-01-29 | End: 1900-01-01

## 2025-02-03 ENCOUNTER — APPOINTMENT (OUTPATIENT)
Dept: INTERNAL MEDICINE | Facility: CLINIC | Age: 40
End: 2025-02-03
Payer: COMMERCIAL

## 2025-02-03 DIAGNOSIS — J30.9 ALLERGIC RHINITIS, UNSPECIFIED: ICD-10-CM

## 2025-02-03 PROCEDURE — 95117 IMMUNOTHERAPY INJECTIONS: CPT

## 2025-02-03 PROCEDURE — 95165 ANTIGEN THERAPY SERVICES: CPT

## 2025-02-19 ENCOUNTER — APPOINTMENT (OUTPATIENT)
Dept: INTERNAL MEDICINE | Facility: CLINIC | Age: 40
End: 2025-02-19
Payer: COMMERCIAL

## 2025-02-19 PROCEDURE — 95117 IMMUNOTHERAPY INJECTIONS: CPT

## 2025-02-19 PROCEDURE — 95165 ANTIGEN THERAPY SERVICES: CPT

## 2025-02-26 ENCOUNTER — APPOINTMENT (OUTPATIENT)
Dept: INTERNAL MEDICINE | Facility: CLINIC | Age: 40
End: 2025-02-26
Payer: COMMERCIAL

## 2025-02-26 DIAGNOSIS — J30.9 ALLERGIC RHINITIS, UNSPECIFIED: ICD-10-CM

## 2025-02-26 DIAGNOSIS — J45.909 UNSPECIFIED ASTHMA, UNCOMPLICATED: ICD-10-CM

## 2025-02-26 PROCEDURE — 95165 ANTIGEN THERAPY SERVICES: CPT

## 2025-02-26 PROCEDURE — 95117 IMMUNOTHERAPY INJECTIONS: CPT

## 2025-03-10 ENCOUNTER — APPOINTMENT (OUTPATIENT)
Dept: INTERNAL MEDICINE | Facility: CLINIC | Age: 40
End: 2025-03-10
Payer: COMMERCIAL

## 2025-03-10 DIAGNOSIS — J30.9 ALLERGIC RHINITIS, UNSPECIFIED: ICD-10-CM

## 2025-03-10 PROCEDURE — 95117 IMMUNOTHERAPY INJECTIONS: CPT

## 2025-03-10 PROCEDURE — 95165 ANTIGEN THERAPY SERVICES: CPT

## 2025-03-28 ENCOUNTER — APPOINTMENT (OUTPATIENT)
Dept: INTERNAL MEDICINE | Facility: CLINIC | Age: 40
End: 2025-03-28
Payer: COMMERCIAL

## 2025-03-28 DIAGNOSIS — J30.9 ALLERGIC RHINITIS, UNSPECIFIED: ICD-10-CM

## 2025-03-28 PROCEDURE — 95117 IMMUNOTHERAPY INJECTIONS: CPT

## 2025-03-28 PROCEDURE — 95165 ANTIGEN THERAPY SERVICES: CPT

## 2025-04-07 ENCOUNTER — APPOINTMENT (OUTPATIENT)
Dept: INTERNAL MEDICINE | Facility: CLINIC | Age: 40
End: 2025-04-07
Payer: COMMERCIAL

## 2025-04-07 DIAGNOSIS — J30.9 ALLERGIC RHINITIS, UNSPECIFIED: ICD-10-CM

## 2025-04-07 PROCEDURE — 95117 IMMUNOTHERAPY INJECTIONS: CPT

## 2025-04-07 PROCEDURE — 95165 ANTIGEN THERAPY SERVICES: CPT

## 2025-04-21 ENCOUNTER — APPOINTMENT (OUTPATIENT)
Dept: INTERNAL MEDICINE | Facility: CLINIC | Age: 40
End: 2025-04-21
Payer: COMMERCIAL

## 2025-04-21 DIAGNOSIS — J30.9 ALLERGIC RHINITIS, UNSPECIFIED: ICD-10-CM

## 2025-04-21 PROCEDURE — 95117 IMMUNOTHERAPY INJECTIONS: CPT

## 2025-04-21 PROCEDURE — 95165 ANTIGEN THERAPY SERVICES: CPT

## 2025-05-07 ENCOUNTER — APPOINTMENT (OUTPATIENT)
Dept: INTERNAL MEDICINE | Facility: CLINIC | Age: 40
End: 2025-05-07
Payer: COMMERCIAL

## 2025-05-07 DIAGNOSIS — J30.9 ALLERGIC RHINITIS, UNSPECIFIED: ICD-10-CM

## 2025-05-07 PROCEDURE — 95165 ANTIGEN THERAPY SERVICES: CPT

## 2025-05-07 PROCEDURE — 95117 IMMUNOTHERAPY INJECTIONS: CPT

## 2025-05-21 ENCOUNTER — APPOINTMENT (OUTPATIENT)
Dept: INTERNAL MEDICINE | Facility: CLINIC | Age: 40
End: 2025-05-21
Payer: COMMERCIAL

## 2025-05-21 DIAGNOSIS — J30.9 ALLERGIC RHINITIS, UNSPECIFIED: ICD-10-CM

## 2025-05-21 PROCEDURE — 95117 IMMUNOTHERAPY INJECTIONS: CPT

## 2025-05-21 PROCEDURE — 95165 ANTIGEN THERAPY SERVICES: CPT

## 2025-06-03 ENCOUNTER — RX RENEWAL (OUTPATIENT)
Age: 40
End: 2025-06-03

## 2025-06-04 ENCOUNTER — APPOINTMENT (OUTPATIENT)
Dept: INTERNAL MEDICINE | Facility: CLINIC | Age: 40
End: 2025-06-04
Payer: COMMERCIAL

## 2025-06-04 DIAGNOSIS — J30.9 ALLERGIC RHINITIS, UNSPECIFIED: ICD-10-CM

## 2025-06-04 PROCEDURE — 95117 IMMUNOTHERAPY INJECTIONS: CPT

## 2025-06-04 PROCEDURE — 95165 ANTIGEN THERAPY SERVICES: CPT

## 2025-06-18 ENCOUNTER — APPOINTMENT (OUTPATIENT)
Dept: INTERNAL MEDICINE | Facility: CLINIC | Age: 40
End: 2025-06-18
Payer: COMMERCIAL

## 2025-06-18 PROCEDURE — 95117 IMMUNOTHERAPY INJECTIONS: CPT

## 2025-06-18 PROCEDURE — 95165 ANTIGEN THERAPY SERVICES: CPT

## 2025-07-02 ENCOUNTER — APPOINTMENT (OUTPATIENT)
Dept: INTERNAL MEDICINE | Facility: CLINIC | Age: 40
End: 2025-07-02
Payer: COMMERCIAL

## 2025-07-02 PROCEDURE — 95117 IMMUNOTHERAPY INJECTIONS: CPT

## 2025-07-02 PROCEDURE — 95165 ANTIGEN THERAPY SERVICES: CPT

## 2025-07-14 ENCOUNTER — APPOINTMENT (OUTPATIENT)
Dept: INTERNAL MEDICINE | Facility: CLINIC | Age: 40
End: 2025-07-14
Payer: COMMERCIAL

## 2025-07-14 PROCEDURE — 95165 ANTIGEN THERAPY SERVICES: CPT

## 2025-07-14 PROCEDURE — 95117 IMMUNOTHERAPY INJECTIONS: CPT

## 2025-07-28 ENCOUNTER — APPOINTMENT (OUTPATIENT)
Dept: INTERNAL MEDICINE | Facility: CLINIC | Age: 40
End: 2025-07-28
Payer: COMMERCIAL

## 2025-07-28 PROCEDURE — 95117 IMMUNOTHERAPY INJECTIONS: CPT

## 2025-07-28 PROCEDURE — 95165 ANTIGEN THERAPY SERVICES: CPT

## 2025-08-11 ENCOUNTER — APPOINTMENT (OUTPATIENT)
Dept: INTERNAL MEDICINE | Facility: CLINIC | Age: 40
End: 2025-08-11
Payer: COMMERCIAL

## 2025-08-11 DIAGNOSIS — J30.9 ALLERGIC RHINITIS, UNSPECIFIED: ICD-10-CM

## 2025-08-11 PROCEDURE — 95165 ANTIGEN THERAPY SERVICES: CPT

## 2025-08-11 PROCEDURE — 95117 IMMUNOTHERAPY INJECTIONS: CPT

## 2025-08-25 ENCOUNTER — APPOINTMENT (OUTPATIENT)
Dept: INTERNAL MEDICINE | Facility: CLINIC | Age: 40
End: 2025-08-25
Payer: COMMERCIAL

## 2025-08-25 DIAGNOSIS — J30.9 ALLERGIC RHINITIS, UNSPECIFIED: ICD-10-CM

## 2025-08-25 PROCEDURE — 95165 ANTIGEN THERAPY SERVICES: CPT

## 2025-08-25 PROCEDURE — 95117 IMMUNOTHERAPY INJECTIONS: CPT

## 2025-09-08 ENCOUNTER — APPOINTMENT (OUTPATIENT)
Dept: INTERNAL MEDICINE | Facility: CLINIC | Age: 40
End: 2025-09-08

## 2025-09-08 DIAGNOSIS — J30.9 ALLERGIC RHINITIS, UNSPECIFIED: ICD-10-CM

## 2025-09-08 PROCEDURE — 95165 ANTIGEN THERAPY SERVICES: CPT

## 2025-09-08 PROCEDURE — 95117 IMMUNOTHERAPY INJECTIONS: CPT
